# Patient Record
Sex: FEMALE | Race: WHITE | Employment: UNEMPLOYED | URBAN - METROPOLITAN AREA
[De-identification: names, ages, dates, MRNs, and addresses within clinical notes are randomized per-mention and may not be internally consistent; named-entity substitution may affect disease eponyms.]

---

## 2017-01-28 ENCOUNTER — APPOINTMENT (OUTPATIENT)
Dept: ULTRASOUND IMAGING | Age: 82
DRG: 419 | End: 2017-01-28
Attending: PHYSICIAN ASSISTANT
Payer: MEDICARE

## 2017-01-28 ENCOUNTER — APPOINTMENT (OUTPATIENT)
Dept: CT IMAGING | Age: 82
DRG: 419 | End: 2017-01-28
Attending: PHYSICIAN ASSISTANT
Payer: MEDICARE

## 2017-01-28 ENCOUNTER — APPOINTMENT (OUTPATIENT)
Dept: GENERAL RADIOLOGY | Age: 82
DRG: 419 | End: 2017-01-28
Attending: PHYSICIAN ASSISTANT
Payer: MEDICARE

## 2017-01-28 ENCOUNTER — HOSPITAL ENCOUNTER (INPATIENT)
Age: 82
LOS: 3 days | Discharge: HOME | DRG: 419 | End: 2017-01-31
Attending: FAMILY MEDICINE | Admitting: SURGERY
Payer: MEDICARE

## 2017-01-28 DIAGNOSIS — R74.01 TRANSAMINITIS: ICD-10-CM

## 2017-01-28 DIAGNOSIS — K80.00 ACUTE CALCULOUS CHOLECYSTITIS: ICD-10-CM

## 2017-01-28 DIAGNOSIS — K80.42 CHOLEDOCHOLITHIASIS WITH ACUTE CHOLECYSTITIS: Primary | ICD-10-CM

## 2017-01-28 LAB
ALBUMIN SERPL BCP-MCNC: 3.6 G/DL (ref 3.4–5)
ALBUMIN/GLOB SERPL: 1.1 {RATIO} (ref 0.8–1.7)
ALP SERPL-CCNC: 212 U/L (ref 45–117)
ALT SERPL-CCNC: 589 U/L (ref 13–56)
ANION GAP BLD CALC-SCNC: 10 MMOL/L (ref 3–18)
APTT PPP: 26.2 SEC (ref 23–36.4)
AST SERPL W P-5'-P-CCNC: 1206 U/L (ref 15–37)
BASOPHILS # BLD AUTO: 0 K/UL (ref 0–0.06)
BASOPHILS # BLD: 0 % (ref 0–2)
BILIRUB SERPL-MCNC: 2.7 MG/DL (ref 0.2–1)
BNP SERPL-MCNC: 510 PG/ML (ref 0–1800)
BUN SERPL-MCNC: 16 MG/DL (ref 7–18)
BUN/CREAT SERPL: 22 (ref 12–20)
CALCIUM SERPL-MCNC: 8.5 MG/DL (ref 8.5–10.1)
CHLORIDE SERPL-SCNC: 102 MMOL/L (ref 100–108)
CK MB CFR SERPL CALC: 2.6 % (ref 0–4)
CK MB SERPL-MCNC: 1.9 NG/ML (ref 0.5–3.6)
CK SERPL-CCNC: 74 U/L (ref 26–192)
CO2 SERPL-SCNC: 26 MMOL/L (ref 21–32)
CREAT SERPL-MCNC: 0.72 MG/DL (ref 0.6–1.3)
DIFFERENTIAL METHOD BLD: ABNORMAL
EOSINOPHIL # BLD: 0 K/UL (ref 0–0.4)
EOSINOPHIL NFR BLD: 0 % (ref 0–5)
ERYTHROCYTE [DISTWIDTH] IN BLOOD BY AUTOMATED COUNT: 14.3 % (ref 11.6–14.5)
FLUAV AG NPH QL IA: NEGATIVE
FLUBV AG NOSE QL IA: NEGATIVE
GLOBULIN SER CALC-MCNC: 3.3 G/DL (ref 2–4)
GLUCOSE SERPL-MCNC: 127 MG/DL (ref 74–99)
HCT VFR BLD AUTO: 38.5 % (ref 35–45)
HGB BLD-MCNC: 12.6 G/DL (ref 12–16)
INR PPP: 1 (ref 0.8–1.2)
LIPASE SERPL-CCNC: 457 U/L (ref 73–393)
LYMPHOCYTES # BLD AUTO: 7 % (ref 21–52)
LYMPHOCYTES # BLD: 0.8 K/UL (ref 0.9–3.6)
MCH RBC QN AUTO: 28.1 PG (ref 24–34)
MCHC RBC AUTO-ENTMCNC: 32.7 G/DL (ref 31–37)
MCV RBC AUTO: 85.9 FL (ref 74–97)
MONOCYTES # BLD: 0.6 K/UL (ref 0.05–1.2)
MONOCYTES NFR BLD AUTO: 5 % (ref 3–10)
NEUTS SEG # BLD: 10 K/UL (ref 1.8–8)
NEUTS SEG NFR BLD AUTO: 88 % (ref 40–73)
PLATELET # BLD AUTO: 267 K/UL (ref 135–420)
PMV BLD AUTO: 10.2 FL (ref 9.2–11.8)
POTASSIUM SERPL-SCNC: 4 MMOL/L (ref 3.5–5.5)
PROT SERPL-MCNC: 6.9 G/DL (ref 6.4–8.2)
PROTHROMBIN TIME: 12.5 SEC (ref 11.5–15.2)
RBC # BLD AUTO: 4.48 M/UL (ref 4.2–5.3)
SODIUM SERPL-SCNC: 138 MMOL/L (ref 136–145)
TROPONIN I SERPL-MCNC: <0.02 NG/ML (ref 0–0.06)
WBC # BLD AUTO: 11.4 K/UL (ref 4.6–13.2)

## 2017-01-28 PROCEDURE — 96374 THER/PROPH/DIAG INJ IV PUSH: CPT

## 2017-01-28 PROCEDURE — 74177 CT ABD & PELVIS W/CONTRAST: CPT

## 2017-01-28 PROCEDURE — 80053 COMPREHEN METABOLIC PANEL: CPT | Performed by: PHYSICIAN ASSISTANT

## 2017-01-28 PROCEDURE — 85730 THROMBOPLASTIN TIME PARTIAL: CPT | Performed by: PHYSICIAN ASSISTANT

## 2017-01-28 PROCEDURE — 71010 XR CHEST PORT: CPT

## 2017-01-28 PROCEDURE — 82550 ASSAY OF CK (CPK): CPT | Performed by: PHYSICIAN ASSISTANT

## 2017-01-28 PROCEDURE — 96375 TX/PRO/DX INJ NEW DRUG ADDON: CPT

## 2017-01-28 PROCEDURE — 74011250636 HC RX REV CODE- 250/636: Performed by: PHYSICIAN ASSISTANT

## 2017-01-28 PROCEDURE — 85025 COMPLETE CBC W/AUTO DIFF WBC: CPT | Performed by: PHYSICIAN ASSISTANT

## 2017-01-28 PROCEDURE — 96361 HYDRATE IV INFUSION ADD-ON: CPT

## 2017-01-28 PROCEDURE — 74011250636 HC RX REV CODE- 250/636: Performed by: SURGERY

## 2017-01-28 PROCEDURE — 99285 EMERGENCY DEPT VISIT HI MDM: CPT

## 2017-01-28 PROCEDURE — 74011636320 HC RX REV CODE- 636/320: Performed by: FAMILY MEDICINE

## 2017-01-28 PROCEDURE — 96376 TX/PRO/DX INJ SAME DRUG ADON: CPT

## 2017-01-28 PROCEDURE — 83880 ASSAY OF NATRIURETIC PEPTIDE: CPT | Performed by: PHYSICIAN ASSISTANT

## 2017-01-28 PROCEDURE — 74011250636 HC RX REV CODE- 250/636: Performed by: INTERNAL MEDICINE

## 2017-01-28 PROCEDURE — 74011250637 HC RX REV CODE- 250/637: Performed by: SURGERY

## 2017-01-28 PROCEDURE — 76705 ECHO EXAM OF ABDOMEN: CPT

## 2017-01-28 PROCEDURE — 87804 INFLUENZA ASSAY W/OPTIC: CPT | Performed by: PHYSICIAN ASSISTANT

## 2017-01-28 PROCEDURE — 83690 ASSAY OF LIPASE: CPT | Performed by: PHYSICIAN ASSISTANT

## 2017-01-28 PROCEDURE — 85610 PROTHROMBIN TIME: CPT | Performed by: PHYSICIAN ASSISTANT

## 2017-01-28 PROCEDURE — 74011000258 HC RX REV CODE- 258: Performed by: PHYSICIAN ASSISTANT

## 2017-01-28 PROCEDURE — 93005 ELECTROCARDIOGRAM TRACING: CPT

## 2017-01-28 PROCEDURE — C9113 INJ PANTOPRAZOLE SODIUM, VIA: HCPCS | Performed by: PHYSICIAN ASSISTANT

## 2017-01-28 PROCEDURE — 65270000029 HC RM PRIVATE

## 2017-01-28 RX ORDER — ONDANSETRON 2 MG/ML
4 INJECTION INTRAMUSCULAR; INTRAVENOUS
Status: COMPLETED | OUTPATIENT
Start: 2017-01-28 | End: 2017-01-28

## 2017-01-28 RX ORDER — MELATONIN
1000 DAILY
Status: ON HOLD | COMMUNITY
End: 2017-01-28

## 2017-01-28 RX ORDER — EPINEPHRINE 0.1 MG/ML
1 INJECTION INTRACARDIAC; INTRAVENOUS
Status: CANCELLED | OUTPATIENT
Start: 2017-01-28 | End: 2017-01-28

## 2017-01-28 RX ORDER — SODIUM CHLORIDE 9 MG/ML
100 INJECTION, SOLUTION INTRAVENOUS CONTINUOUS
Status: DISCONTINUED | OUTPATIENT
Start: 2017-01-28 | End: 2017-01-30

## 2017-01-28 RX ORDER — CITALOPRAM 20 MG/1
20 TABLET, FILM COATED ORAL DAILY
Status: DISCONTINUED | OUTPATIENT
Start: 2017-01-29 | End: 2017-01-31 | Stop reason: HOSPADM

## 2017-01-28 RX ORDER — PANTOPRAZOLE SODIUM 40 MG/10ML
40 INJECTION, POWDER, LYOPHILIZED, FOR SOLUTION INTRAVENOUS
Status: COMPLETED | OUTPATIENT
Start: 2017-01-28 | End: 2017-01-28

## 2017-01-28 RX ORDER — FLUMAZENIL 0.1 MG/ML
0.2 INJECTION INTRAVENOUS
Status: CANCELLED | OUTPATIENT
Start: 2017-01-28 | End: 2017-01-28

## 2017-01-28 RX ORDER — PANTOPRAZOLE SODIUM 40 MG/1
40 TABLET, DELAYED RELEASE ORAL DAILY
Status: DISCONTINUED | OUTPATIENT
Start: 2017-01-29 | End: 2017-01-31 | Stop reason: HOSPADM

## 2017-01-28 RX ORDER — OXYCODONE AND ACETAMINOPHEN 5; 325 MG/1; MG/1
1 TABLET ORAL
Status: DISCONTINUED | OUTPATIENT
Start: 2017-01-28 | End: 2017-01-31 | Stop reason: HOSPADM

## 2017-01-28 RX ORDER — CITALOPRAM 20 MG/1
20 TABLET, FILM COATED ORAL DAILY
COMMUNITY

## 2017-01-28 RX ORDER — SODIUM CHLORIDE 0.9 % (FLUSH) 0.9 %
5-10 SYRINGE (ML) INJECTION EVERY 8 HOURS
Status: DISCONTINUED | OUTPATIENT
Start: 2017-01-28 | End: 2017-01-31 | Stop reason: HOSPADM

## 2017-01-28 RX ORDER — MIDAZOLAM HYDROCHLORIDE 1 MG/ML
.5-5 INJECTION, SOLUTION INTRAMUSCULAR; INTRAVENOUS
Status: CANCELLED | OUTPATIENT
Start: 2017-01-28 | End: 2017-01-28

## 2017-01-28 RX ORDER — AMLODIPINE BESYLATE 2.5 MG/1
2.5 TABLET ORAL DAILY
Status: DISCONTINUED | OUTPATIENT
Start: 2017-01-29 | End: 2017-01-31 | Stop reason: HOSPADM

## 2017-01-28 RX ORDER — DEXTROSE, SODIUM CHLORIDE, AND POTASSIUM CHLORIDE 5; .45; .15 G/100ML; G/100ML; G/100ML
100 INJECTION INTRAVENOUS CONTINUOUS
Status: DISCONTINUED | OUTPATIENT
Start: 2017-01-28 | End: 2017-01-30

## 2017-01-28 RX ORDER — DIPHENHYDRAMINE HYDROCHLORIDE 50 MG/ML
12.5 INJECTION, SOLUTION INTRAMUSCULAR; INTRAVENOUS
Status: DISCONTINUED | OUTPATIENT
Start: 2017-01-28 | End: 2017-01-31 | Stop reason: HOSPADM

## 2017-01-28 RX ORDER — NALOXONE HYDROCHLORIDE 0.4 MG/ML
0.4 INJECTION, SOLUTION INTRAMUSCULAR; INTRAVENOUS; SUBCUTANEOUS
Status: CANCELLED | OUTPATIENT
Start: 2017-01-28 | End: 2017-01-28

## 2017-01-28 RX ORDER — DONEPEZIL HYDROCHLORIDE 5 MG/1
5 TABLET, FILM COATED ORAL
Status: DISCONTINUED | OUTPATIENT
Start: 2017-01-28 | End: 2017-01-31 | Stop reason: HOSPADM

## 2017-01-28 RX ORDER — ALENDRONATE SODIUM 70 MG/1
70 TABLET ORAL
Status: ON HOLD | COMMUNITY
End: 2017-01-28

## 2017-01-28 RX ORDER — ATENOLOL 25 MG/1
25 TABLET ORAL DAILY
COMMUNITY

## 2017-01-28 RX ORDER — ASPIRIN 81 MG/1
81 TABLET ORAL DAILY
Status: ON HOLD | COMMUNITY
End: 2017-01-28

## 2017-01-28 RX ORDER — AMLODIPINE BESYLATE 2.5 MG/1
5 TABLET ORAL DAILY
COMMUNITY

## 2017-01-28 RX ORDER — ONDANSETRON 2 MG/ML
4 INJECTION INTRAMUSCULAR; INTRAVENOUS
Status: DISCONTINUED | OUTPATIENT
Start: 2017-01-28 | End: 2017-01-31 | Stop reason: HOSPADM

## 2017-01-28 RX ORDER — SODIUM CHLORIDE 9 MG/ML
1000 INJECTION, SOLUTION INTRAVENOUS ONCE
Status: COMPLETED | OUTPATIENT
Start: 2017-01-28 | End: 2017-01-28

## 2017-01-28 RX ORDER — ACETAMINOPHEN 325 MG/1
650 TABLET ORAL
Status: DISCONTINUED | OUTPATIENT
Start: 2017-01-28 | End: 2017-01-31 | Stop reason: HOSPADM

## 2017-01-28 RX ORDER — SODIUM CHLORIDE 9 MG/ML
100 INJECTION, SOLUTION INTRAVENOUS ONCE
Status: COMPLETED | OUTPATIENT
Start: 2017-01-28 | End: 2017-01-28

## 2017-01-28 RX ORDER — DEXTROMETHORPHAN/PSEUDOEPHED 2.5-7.5/.8
1.2 DROPS ORAL
Status: CANCELLED | OUTPATIENT
Start: 2017-01-28

## 2017-01-28 RX ORDER — ACETAMINOPHEN 10 MG/ML
1000 INJECTION, SOLUTION INTRAVENOUS ONCE
Status: COMPLETED | OUTPATIENT
Start: 2017-01-28 | End: 2017-01-28

## 2017-01-28 RX ORDER — SODIUM CHLORIDE 0.9 % (FLUSH) 0.9 %
5-10 SYRINGE (ML) INJECTION AS NEEDED
Status: DISCONTINUED | OUTPATIENT
Start: 2017-01-28 | End: 2017-01-31 | Stop reason: HOSPADM

## 2017-01-28 RX ORDER — ATROPINE SULFATE 0.1 MG/ML
0.5 INJECTION INTRAVENOUS
Status: CANCELLED | OUTPATIENT
Start: 2017-01-28 | End: 2017-01-28

## 2017-01-28 RX ORDER — MORPHINE SULFATE 4 MG/ML
4 INJECTION, SOLUTION INTRAMUSCULAR; INTRAVENOUS
Status: COMPLETED | OUTPATIENT
Start: 2017-01-28 | End: 2017-01-28

## 2017-01-28 RX ORDER — FENTANYL CITRATE 50 UG/ML
100 INJECTION, SOLUTION INTRAMUSCULAR; INTRAVENOUS
Status: CANCELLED | OUTPATIENT
Start: 2017-01-28 | End: 2017-01-28

## 2017-01-28 RX ORDER — HYDROMORPHONE HYDROCHLORIDE 1 MG/ML
1 INJECTION, SOLUTION INTRAMUSCULAR; INTRAVENOUS; SUBCUTANEOUS
Status: DISCONTINUED | OUTPATIENT
Start: 2017-01-28 | End: 2017-01-30

## 2017-01-28 RX ORDER — ATENOLOL 25 MG/1
25 TABLET ORAL DAILY
Status: DISCONTINUED | OUTPATIENT
Start: 2017-01-29 | End: 2017-01-31 | Stop reason: HOSPADM

## 2017-01-28 RX ORDER — DONEPEZIL HYDROCHLORIDE 5 MG/1
5 TABLET, FILM COATED ORAL
COMMUNITY
End: 2021-11-26

## 2017-01-28 RX ORDER — OMEPRAZOLE 40 MG/1
40 CAPSULE, DELAYED RELEASE ORAL DAILY
COMMUNITY
End: 2021-11-26

## 2017-01-28 RX ORDER — GABAPENTIN 100 MG/1
100 CAPSULE ORAL 3 TIMES DAILY
COMMUNITY

## 2017-01-28 RX ORDER — ACETAMINOPHEN 325 MG/1
650 TABLET ORAL
Status: DISCONTINUED | OUTPATIENT
Start: 2017-01-28 | End: 2017-01-30

## 2017-01-28 RX ORDER — GABAPENTIN 100 MG/1
100 CAPSULE ORAL 3 TIMES DAILY
Status: DISCONTINUED | OUTPATIENT
Start: 2017-01-28 | End: 2017-01-31 | Stop reason: HOSPADM

## 2017-01-28 RX ADMIN — ONDANSETRON 4 MG: 2 INJECTION INTRAMUSCULAR; INTRAVENOUS at 14:59

## 2017-01-28 RX ADMIN — Medication 4 MG: at 14:59

## 2017-01-28 RX ADMIN — DONEPEZIL HYDROCHLORIDE 5 MG: 5 TABLET, FILM COATED ORAL at 21:48

## 2017-01-28 RX ADMIN — SODIUM CHLORIDE 1000 ML: 900 INJECTION, SOLUTION INTRAVENOUS at 11:53

## 2017-01-28 RX ADMIN — SODIUM CHLORIDE 100 ML/HR: 900 INJECTION, SOLUTION INTRAVENOUS at 16:28

## 2017-01-28 RX ADMIN — PANTOPRAZOLE SODIUM 40 MG: 40 INJECTION, POWDER, FOR SOLUTION INTRAVENOUS at 11:54

## 2017-01-28 RX ADMIN — ONDANSETRON 4 MG: 2 INJECTION INTRAMUSCULAR; INTRAVENOUS at 11:54

## 2017-01-28 RX ADMIN — CEFOXITIN 2 G: 2 INJECTION, POWDER, FOR SOLUTION INTRAVENOUS at 16:28

## 2017-01-28 RX ADMIN — IOPAMIDOL 100 ML: 612 INJECTION, SOLUTION INTRAVENOUS at 12:43

## 2017-01-28 RX ADMIN — ACETAMINOPHEN 1000 MG: 10 INJECTION, SOLUTION INTRAVENOUS at 11:53

## 2017-01-28 RX ADMIN — DEXTROSE MONOHYDRATE, SODIUM CHLORIDE, AND POTASSIUM CHLORIDE 100 ML/HR: 50; 4.5; 1.49 INJECTION, SOLUTION INTRAVENOUS at 19:36

## 2017-01-28 RX ADMIN — SODIUM CHLORIDE 100 ML/HR: 900 INJECTION, SOLUTION INTRAVENOUS at 14:25

## 2017-01-28 RX ADMIN — GABAPENTIN 100 MG: 100 CAPSULE ORAL at 21:48

## 2017-01-28 NOTE — IP AVS SNAPSHOT
Current Discharge Medication List  
  
Take these medications at their scheduled times Dose & Instructions Dispensing Information Comments Morning Noon Evening Bedtime  
 amLODIPine 2.5 mg tablet Commonly known as:  Rudene Post Your next dose is:  Tomorrow Dose:  2.5 mg Take 2.5 mg by mouth daily. Refills:  0  
     
   
   
   
  
 atenolol 25 mg tablet Commonly known as:  TENORMIN Your next dose is:  Tomorrow Dose:  25 mg Take 25 mg by mouth daily. Refills:  0  
     
   
   
   
  
 citalopram 20 mg tablet Commonly known as:  [place] Breslow Your next dose is:  Tomorrow Dose:  20 mg Take 20 mg by mouth daily. Refills:  0  
     
   
   
   
  
 donepezil 5 mg tablet Commonly known as:  ARICEPT Your next dose is: Today Dose:  5 mg Take 5 mg by mouth nightly. Refills:  0  
     
   
   
   
  
 gabapentin 100 mg capsule Commonly known as:  NEURONTIN Your next dose is: Today Dose:  100 mg Take 100 mg by mouth three (3) times daily. Refills:  0  
     
   
   
   
  
 omeprazole 40 mg capsule Commonly known as:  PRILOSEC Your next dose is:  Tomorrow Dose:  40 mg Take 40 mg by mouth daily. Refills:  0 Take these medications as needed Dose & Instructions Dispensing Information Comments Morning Noon Evening Bedtime  
 oxyCODONE-acetaminophen 5-325 mg per tablet Commonly known as:  PERCOCET Your next dose is: Today Dose:  1 Tab Take 1 Tab by mouth every four (4) hours as needed for Pain. Max Daily Amount: 6 Tabs. Quantity:  20 Tab Refills:  0 ASK your doctor about these medications Dose & Instructions Dispensing Information Comments Morning Noon Evening Bedtime  
 alendronate 70 mg tablet Commonly known as:  FOSAMAX Dose:  70 mg Take 70 mg by mouth every seven (7) days. Refills:  0 aspirin delayed-release 81 mg tablet Your next dose is:  Tomorrow Dose:  81 mg Take 81 mg by mouth daily. Refills:  0  
     
   
   
   
  
 VITAMIN D3 1,000 unit tablet Generic drug:  cholecalciferol Your next dose is: Today Dose:  1000 Units Take 1,000 Units by mouth daily. Refills:  0 Where to Get Your Medications Information about where to get these medications is not yet available ! Ask your nurse or doctor about these medications  
  oxyCODONE-acetaminophen 5-325 mg per tablet

## 2017-01-28 NOTE — IP AVS SNAPSHOT
Lashaun Duarte 
 
 
 [address] [facility] [address] 
[phone] Patient: Shannan Brewer MRN: [id number] You are allergic to the following No active allergies Recent Documentation Height Weight BMI Smoking Status 1.575 m 64.9 kg 26.15 kg/m2 Never Smoker Emergency Contacts Name Discharge Info Relation Home Work Mobile [facility] DISCHARGE CAREGIVER [3] Son [22]   [phone] About your hospitalization You were admitted on:  January 28, 2017 You last received care in the:  [address] [place] [address] You were discharged on:  January 31, 2017 Unit phone number:  [phone] Why you were hospitalized Your primary diagnosis was:  Not on File Your diagnoses also included:  Choledocholithiasis With Acute Cholecystitis, Cholecystitis, Acute With Cholelithiasis Providers Seen During Your Hospitalizations Provider Role Specialty Primary office phone Eliud Maldonado MD Attending Provider Emergency Medicine [phone] Baron Mckeon MD Attending Provider General Surgery [phone] Your Primary Care Physician (PCP) Primary Care Physician Office Phone Office Fax OTHER, PHYS ** None ** ** None ** Follow-up Information Follow up With Details Comments Contact Info Baron Mckeon MD On 2/7/2017 Follow-up appointment @ 1:30 p.m. [address] [facility] [address] 
[phone] Ashleigh Doe MD   Patient can only remember the practice name and not the physician Current Discharge Medication List  
  
START taking these medications Dose & Instructions Dispensing Information Comments Morning Noon Evening Bedtime  
 oxyCODONE-acetaminophen 5-325 mg per tablet Commonly known as:  PERCOCET Your next dose is: Today Dose:  1 Tab Take 1 Tab by mouth every four (4) hours as needed for Pain. Max Daily Amount: 6 Tabs. Quantity:  20 Tab Refills:  0 CONTINUE these medications which have NOT CHANGED Dose & Instructions Dispensing Information Comments Morning Noon Evening Bedtime  
 amLODIPine 2.5 mg tablet Commonly known as:  Nette Croon Your next dose is:  Tomorrow Dose:  2.5 mg Take 2.5 mg by mouth daily. Refills:  0  
     
   
   
   
  
 atenolol 25 mg tablet Commonly known as:  TENORMIN Your next dose is:  Tomorrow Dose:  25 mg Take 25 mg by mouth daily. Refills:  0  
     
   
   
   
  
 citalopram 20 mg tablet Commonly known as:  [place] Campanile Your next dose is:  Tomorrow Dose:  20 mg Take 20 mg by mouth daily. Refills:  0  
     
   
   
   
  
 donepezil 5 mg tablet Commonly known as:  ARICEPT Your next dose is: Today Dose:  5 mg Take 5 mg by mouth nightly. Refills:  0  
     
   
   
   
  
 gabapentin 100 mg capsule Commonly known as:  NEURONTIN Your next dose is: Today Dose:  100 mg Take 100 mg by mouth three (3) times daily. Refills:  0  
     
   
   
   
  
 omeprazole 40 mg capsule Commonly known as:  PRILOSEC Your next dose is:  Tomorrow Dose:  40 mg Take 40 mg by mouth daily. Refills:  0 ASK your doctor about these medications Dose & Instructions Dispensing Information Comments Morning Noon Evening Bedtime  
 alendronate 70 mg tablet Commonly known as:  FOSAMAX Dose:  70 mg Take 70 mg by mouth every seven (7) days. Refills:  0  
     
   
   
   
  
 aspirin delayed-release 81 mg tablet Your next dose is:  Tomorrow Dose:  81 mg Take 81 mg by mouth daily. Refills:  0  
     
   
   
   
  
 VITAMIN D3 1,000 unit tablet Generic drug:  cholecalciferol Your next dose is: Today Dose:  1000 Units Take 1,000 Units by mouth daily. Refills:  0 Where to Get Your Medications Information on where to get these meds will be given to you by the nurse or doctor. ! Ask your nurse or doctor about these medications  
  oxyCODONE-acetaminophen 5-325 mg per tablet Discharge Instructions DISCHARGE SUMMARY from Nurse The following personal items are in your possession at time of discharge: 
 
Dental Appliances: None Home Medications: None Jewelry: None Clothing: None Other Valuables: None PATIENT INSTRUCTIONS: 
 
 
F-face looks uneven A-arms unable to move or move unevenly S-speech slurred or non-existent T-time-call 911 as soon as signs and symptoms begin-DO NOT go Back to bed or wait to see if you get better-TIME IS BRAIN. Warning Signs of HEART ATTACK Call 911 if you have these symptoms: 
? Chest discomfort. Most heart attacks involve discomfort in the center of the chest that lasts more than a few minutes, or that goes away and comes back. It can feel like uncomfortable pressure, squeezing, fullness, or pain. ? Discomfort in other areas of the upper body. Symptoms can include pain or discomfort in one or both arms, the back, neck, jaw, or stomach. ? Shortness of breath with or without chest discomfort. ? Other signs may include breaking out in a cold sweat, nausea, or lightheadedness. Don't wait more than five minutes to call [address]! Fast action can save your life. Calling 911 is almost always the fastest way to get lifesaving treatment.  Emergency Medical Services staff can begin treatment when they arrive  up to an hour sooner than if someone gets to the hospital by car. The discharge information has been reviewed with the patient and guardian. The patient and guardian verbalized understanding. Discharge medications reviewed with the patient and guardian and appropriate educational materials and side effects teaching were provided. Cholecystectomy: What to Expect at [facility] Your Recovery After your surgery, it is normal to feel weak and tired for several days after you return home. Your belly may be swollen. If you had laparoscopic surgery, you may also have pain in your shoulder for about 24 hours. You may have gas or need to burp a lot at first, and a few people get diarrhea. The diarrhea usually goes away in 2 to 4 weeks, but it may last longer. How quickly you recover depends on whether you had a laparoscopic or open surgery. · For a laparoscopic surgery, most people can go back to work or their normal routine in 1 to 2 weeks, but it may take longer, depending on the type of work you do. · For an open surgery, it will probably take 4 to 6 weeks before you get back to your normal routine. This care sheet gives you a general idea about how long it will take for you to recover. However, each person recovers at a different pace. Follow the steps below to get better as quickly as possible. How can you care for yourself at home? Activity · Rest when you feel tired. Getting enough sleep will help you recover. · Try to walk each day. Start out by walking a little more than you did the day before. Gradually increase the amount you walk. Walking boosts blood flow and helps prevent pneumonia and constipation. · For about 2 to 4 weeks, avoid lifting anything that would make you strain. This may include a child, heavy grocery bags and milk containers, a heavy briefcase or backpack, cat litter or dog food bags, or a vacuum . · Avoid strenuous activities, such as biking, jogging, weightlifting, and aerobic exercise, until your doctor says it is okay. · You may shower 24 to 48 hours after surgery, if your doctor okays it. Pat the cut (incision) dry. Do not take a bath for the first 2 weeks, or until your doctor tells you it is okay. · You may drive when you are no longer taking pain medicine and can quickly move your foot from the gas pedal to the brake. You must also be able to sit comfortably for a long period of time, even if you do not plan to go far. You might get caught in traffic. · For a laparoscopic surgery, most people can go back to work or their normal routine in 1 to 2 weeks, but it may take longer. For an open surgery, it will probably take 4 to 6 weeks before you get back to your normal routine. · Your doctor will tell you when you can have sex again. Diet · Eat smaller meals more often instead of fewer larger meals. You can eat a normal diet, but avoid eating fatty foods for about 1 month. Fatty foods include hamburger, whole milk, cheese, and many snack foods. If your stomach is upset, try bland, low-fat foods like plain rice, broiled chicken, toast, and yogurt. · Drink plenty of fluids (unless your doctor tells you not to). · If you have diarrhea, try avoiding spicy foods, dairy products, fatty foods, and alcohol. You can also watch to see if specific foods cause it, and stop eating them. If the diarrhea continues for more than 2 weeks, talk to your doctor. · You may notice that your bowel movements are not regular right after your surgery. This is common. Try to avoid constipation and straining with bowel movements. You may want to take a fiber supplement every day. If you have not had a bowel movement after a couple of days, ask your doctor about taking a mild laxative. Medicines · Your doctor will tell you if and when you can restart your medicines.  He or she will also give you instructions about taking any new medicines. · If you take blood thinners, such as warfarin (Coumadin), clopidogrel (Plavix), or aspirin, be sure to talk to your doctor. He or she will tell you if and when to start taking those medicines again. Make sure that you understand exactly what your doctor wants you to do. · Take pain medicines exactly as directed. ¨ If the doctor gave you a prescription medicine for pain, take it as prescribed. ¨ If you are not taking a prescription pain medicine, take an over-the-counter medicine such as acetaminophen (Tylenol), ibuprofen (Advil, Motrin), or naproxen (Aleve). Read and follow all instructions on the label. ¨ Do not take two or more pain medicines at the same time unless the doctor told you to. Many pain medicines contain acetaminophen, which is Tylenol. Too much Tylenol can be harmful. · If you think your pain medicine is making you sick to your stomach: 
¨ Take your medicine after meals (unless your doctor tells you not to). ¨ Ask your doctor for a different pain medicine. · If your doctor prescribed antibiotics, take them as directed. Do not stop taking them just because you feel better. You need to take the full course of antibiotics. Incision care · If you have strips of tape on the incision, or cut, leave the tape on for a week or until it falls off. · After 24 to 48 hours, wash the area daily with warm, soapy water, and pat it dry. · You may have staples to hold the cut together. Keep them dry until your doctor takes them out. This is usually in 7 to 10 days. · Keep the area clean and dry. You may cover it with a gauze bandage if it weeps or rubs against clothing. Change the bandage every day. Ice · To reduce swelling and pain, put ice or a cold pack on your belly for 10 to 20 minutes at a time. Do this every 1 to 2 hours. Put a thin cloth between the ice and your skin. Follow-up care is a key part of your treatment and safety. Be sure to make and go to all appointments, and call your doctor if you are having problems. It's also a good idea to know your test results and keep a list of the medicines you take. When should you call for help? Call 911 anytime you think you may need emergency care. For example, call if: 
· You passed out (lost consciousness). · You have severe trouble breathing. · You have sudden chest pain and shortness of breath, or you cough up blood. Call your doctor now or seek immediate medical care if: 
· You are sick to your stomach and cannot drink fluids. · You have pain that does not get better when you take your pain medicine. · You have signs of infection, such as: 
¨ Increased pain, swelling, warmth, or redness. ¨ Red streaks leading from the incision. ¨ Pus draining from the incision. ¨ Swollen lymph nodes in your neck, armpits, or groin. ¨ A fever. · Your urine turns dark brown or your stool is light-colored or janna-colored. · Your skin or the whites of your eyes turn yellow. · Bright red blood has soaked through a large bandage over your incision. · You have signs of a blood clot, such as: 
¨ Pain in your calf, back of knee, thigh, or groin. ¨ Redness and swelling in your leg or groin. · You have trouble passing urine or stool, especially if you have mild pain or swelling in your lower belly. Watch closely for any changes in your health, and be sure to contact your doctor if: 
· You had a laparoscopic surgery and your shoulder pain lasts more than 24 hours. · You do not have a bowel movement after taking a laxative. Where can you learn more? Go to http://kendall-asher.info/. Enter [id number] in the search box to learn more about \"Cholecystectomy: What to Expect at Home. \" Current as of: August 9, 2016 Content Version: 11.1 © 7067-4239 [employer].  Care instructions adapted under license by [address] (which disclaims liability or warranty for this information). If you have questions about a medical condition or this instruction, always ask your healthcare professional. [address] any warranty or liability for your use of this information. Discharge Orders None Introducing [facility]! Romayne Dustmerritt introduces Teamly patient portal. Now you can access parts of your medical record, email your doctor's office, and request medication refills online. 1. In your internet browser, go to [email or web address] 2. Click on the First Time User? Click Here link in the Sign In box. You will see the New Member Sign Up page. 3. Enter your Teamly Access Code exactly as it appears below. You will not need to use this code after youve completed the sign-up process. If you do not sign up before the expiration date, you must request a new code. · Teamly Access Code: [email or web address] Expires: 4/28/2017 11:07 AM 
 
4. Enter the last four digits of your Social Security Number (xxxx) and Date of Birth (mm/dd/yyyy) as indicated and click Submit. You will be taken to the next sign-up page. 5. Create a Teamly ID. This will be your Teamly login ID and cannot be changed, so think of one that is secure and easy to remember. 6. Create a Teamly password. You can change your password at any time. 7. Enter your Password Reset Question and Answer. This can be used at a later time if you forget your password. 8. Enter your e-mail address. You will receive e-mail notification when new information is available in [address] 9. Click Sign Up. You can now view and download portions of your medical record. 10. Click the Download Summary menu link to download a portable copy of your medical information.  
 
If you have questions, please visit the Frequently Asked Questions section of the [employer]. Remember, MyChart is NOT to be used for urgent needs. For medical emergencies, dial 911. Now available from your iPhone and Android! General Information Please provide this summary of care documentation to your next provider. Patient Signature:  ____________________________________________________________ Date:  ____________________________________________________________  
  
Scharlene Alosa Provider Signature:  ____________________________________________________________ Date:  ____________________________________________________________

## 2017-01-28 NOTE — ED PROVIDER NOTES
HPI Comments: 11:30 AM  Saige Abdi is a 80 y.o. female with hx of HTN, GERD, and BCA with mastectomy in remission presenting to the ED via EMS with abdominal pain that started a couple of days ago accompanied by N/V. Pain is diffuse but worst in RUQ. Since then, her sxs now include a constant substernal CP that is painful to the touch, HA, sinus congestion, in addition to a constant dry cough that started last PM. This cough is unusual from her normal cough. Pt arrived to the area from [place] 1 week ago where she visited for 3 weeks, and usually lives out of state in [place]. She received her flu shot this year. PMHx of GERD, HTN, depression, Osteoperosis, Vitamin D deficiency, and Breast CA. Surgical hx of Mastectomy and spinal fusion. Unsure of abdominal surgeries. Takes daily ASA and Gabapentin, and endorses EtOH use at 2 drinks/day. Pt denies SOB, diarrhea, fever, chills, known sick contacts, hx of heart conditions including MI and blood clots, other blood thinner use, and any other sxs or complaints at this time. Written by YUSRA Pickett, as dictated by Espinoza Campo PA-C       The history is provided by the patient and a relative. No  was used. Past Medical History:   Diagnosis Date    Hypertension        History reviewed. No pertinent past surgical history. History reviewed. No pertinent family history. Social History     Social History    Marital status:      Spouse name: N/A    Number of children: N/A    Years of education: N/A     Occupational History    Not on file. Social History Main Topics    Smoking status: Never Smoker    Smokeless tobacco: Not on file    Alcohol use Yes    Drug use: Not on file    Sexual activity: Not on file     Other Topics Concern    Not on file     Social History Narrative    No narrative on file         ALLERGIES: Review of patient's allergies indicates no known allergies.     Review of Systems Constitutional: Negative for chills and fever. HENT: Positive for congestion. Negative for sore throat. Respiratory: Positive for cough. Negative for shortness of breath. Cardiovascular: Positive for chest pain. Gastrointestinal: Positive for abdominal pain, nausea and vomiting. Genitourinary: Negative for difficulty urinating, dysuria, frequency and urgency. Musculoskeletal: Negative for back pain. Skin: Negative for rash and wound. Allergic/Immunologic: Negative for immunocompromised state. Neurological: Positive for headaches. Negative for dizziness, weakness and light-headedness. Hematological: Does not bruise/bleed easily. Vitals:    01/28/17 1118 01/28/17 1119 01/28/17 1130   BP: 176/60 168/57 168/62   Pulse: 72 69 75   Resp: 18 17 19   Temp: 97.8 °F (36.6 °C)     SpO2:  93% 94%   Weight: 64.9 kg (143 lb)     Height: 5' 2\" (1.575 m)              Physical Exam   Constitutional: She is oriented to person, place, and time. She appears well-developed and well-nourished. No distress.  female in NAD. Appears uncomfortable. No resp distress or acc muscle use. HENT:   Head: Normocephalic and atraumatic. Right Ear: External ear normal. No swelling or tenderness. Tympanic membrane is not perforated, not erythematous and not bulging. Left Ear: External ear normal. No swelling or tenderness. Tympanic membrane is not perforated, not erythematous and not bulging. Nose: Nose normal. No mucosal edema or rhinorrhea. Right sinus exhibits no maxillary sinus tenderness and no frontal sinus tenderness. Left sinus exhibits no maxillary sinus tenderness and no frontal sinus tenderness. Mouth/Throat: Uvula is midline, oropharynx is clear and moist and mucous membranes are normal. No oral lesions. No trismus in the jaw. No dental abscesses or uvula swelling. No oropharyngeal exudate, posterior oropharyngeal edema, posterior oropharyngeal erythema or tonsillar abscesses.    Eyes: Conjunctivae are normal.   Neck: Normal range of motion. Cardiovascular: Normal rate, regular rhythm, normal heart sounds and intact distal pulses. Exam reveals no gallop and no friction rub. No murmur heard. Pulmonary/Chest: Effort normal and breath sounds normal. No accessory muscle usage. No tachypnea. No respiratory distress. She has no decreased breath sounds. She has no wheezes. She has no rhonchi. She has no rales. She exhibits tenderness. Abdominal: Soft. Normal appearance. She exhibits no distension, no ascites and no mass. There is tenderness in the right upper quadrant and epigastric area. There is positive Russell's sign. There is no rigidity, no rebound, no guarding, no CVA tenderness and no tenderness at McBurney's point. Musculoskeletal: Normal range of motion. Neurological: She is alert and oriented to person, place, and time. Skin: Skin is warm and dry. No rash noted. She is not diaphoretic. No erythema. Psychiatric: She has a normal mood and affect. Judgment normal.   Nursing note and vitals reviewed. RESULTS:    EKG interpretation: (Preliminary)  11:12 AM   Sinus rhythm at 68 bpm with 1st degree AV block, Left axis deviation  EKG read by Court Edgar PA-C     US ABD LTD   Final Result   IMPRESSION:       Acute cholecystitis with mild intrahepatic and more prominent common bile duct   dilatation, suggestive of choledocholithiasis as well. As read by the radiologist.   CT ABD PELV W CONT   Final Result   IMPRESSION:  1. CT findings compatible with acute calculus cholecystitis, characterized by a  distended gallbladder, pericholecystic fluid, and pericholecystic fat stranding. Numerous calcified gallstones are present. Mild associated intrahepatic biliary  ductal dilatation noted. 2. Moderate size hiatal hernia without evidence of bowel obstruction. Diverticulosis without evidence of diverticulitis.   3. Rotatory scoliosis of the thoracolumbar spine, with multilevel lumbar  spondylosis and facet osteoarthrosis. Grade 1 anterolisthesis of L3 on L4 as  well as L4 and L5 is noted. As read by the radiologist.   XR CHEST PORT   Final Result   Impression:  Mildly underexpanded lungs without superimposed acute radiographic abnormality.    As read by the radiologist.          Labs Reviewed   CBC WITH AUTOMATED DIFF - Abnormal; Notable for the following:        Result Value    NEUTROPHILS 88 (*)     LYMPHOCYTES 7 (*)     ABS. NEUTROPHILS 10.0 (*)     ABS. LYMPHOCYTES 0.8 (*)     All other components within normal limits   METABOLIC PANEL, COMPREHENSIVE - Abnormal; Notable for the following:     Glucose 127 (*)     BUN/Creatinine ratio 22 (*)     Bilirubin, total 2.7 (*)      (*)     AST 1206 (*)     Alk. phosphatase 212 (*)     All other components within normal limits   LIPASE - Abnormal; Notable for the following:     Lipase 457 (*)     All other components within normal limits   INFLUENZA A & B AG (RAPID TEST)   CARDIAC PANEL,(CK, CKMB & TROPONIN)   PRO-BNP   PROTHROMBIN TIME + INR   PTT       Recent Results (from the past 12 hour(s))   CBC WITH AUTOMATED DIFF    Collection Time: 01/28/17 11:07 AM   Result Value Ref Range    WBC 11.4 4.6 - 13.2 K/uL    RBC 4.48 4.20 - 5.30 M/uL    HGB 12.6 12.0 - 16.0 g/dL    HCT 38.5 35.0 - 45.0 %    MCV 85.9 74.0 - 97.0 FL    MCH 28.1 24.0 - 34.0 PG    MCHC 32.7 31.0 - 37.0 g/dL    RDW 14.3 11.6 - 14.5 %    PLATELET 231 210 - 704 K/uL    MPV 10.2 9.2 - 11.8 FL    NEUTROPHILS 88 (H) 40 - 73 %    LYMPHOCYTES 7 (L) 21 - 52 %    MONOCYTES 5 3 - 10 %    EOSINOPHILS 0 0 - 5 %    BASOPHILS 0 0 - 2 %    ABS. NEUTROPHILS 10.0 (H) 1.8 - 8.0 K/UL    ABS. LYMPHOCYTES 0.8 (L) 0.9 - 3.6 K/UL    ABS. MONOCYTES 0.6 0.05 - 1.2 K/UL    ABS. EOSINOPHILS 0.0 0.0 - 0.4 K/UL    ABS.  BASOPHILS 0.0 0.0 - 0.06 K/UL    DF AUTOMATED     METABOLIC PANEL, COMPREHENSIVE    Collection Time: 01/28/17 11:07 AM   Result Value Ref Range    Sodium 138 136 - 145 mmol/L Potassium 4.0 3.5 - 5.5 mmol/L    Chloride 102 100 - 108 mmol/L    CO2 26 21 - 32 mmol/L    Anion gap 10 3.0 - 18 mmol/L    Glucose 127 (H) 74 - 99 mg/dL    BUN 16 7.0 - 18 MG/DL    Creatinine 0.72 0.6 - 1.3 MG/DL    BUN/Creatinine ratio 22 (H) 12 - 20      GFR est AA >60 >60 ml/min/1.73m2    GFR est non-AA >60 >60 ml/min/1.73m2    Calcium 8.5 8.5 - 10.1 MG/DL    Bilirubin, total 2.7 (H) 0.2 - 1.0 MG/DL     (H) 13 - 56 U/L    AST 1206 (H) 15 - 37 U/L    Alk.  phosphatase 212 (H) 45 - 117 U/L    Protein, total 6.9 6.4 - 8.2 g/dL    Albumin 3.6 3.4 - 5.0 g/dL    Globulin 3.3 2.0 - 4.0 g/dL    A-G Ratio 1.1 0.8 - 1.7     LIPASE    Collection Time: 01/28/17 11:07 AM   Result Value Ref Range    Lipase 457 (H) 73 - 393 U/L   CARDIAC PANEL,(CK, CKMB & TROPONIN)    Collection Time: 01/28/17 11:07 AM   Result Value Ref Range    CK 74 26 - 192 U/L    CK - MB 1.9 0.5 - 3.6 ng/ml    CK-MB Index 2.6 0.0 - 4.0 %    Troponin-I, Qt. <0.02 0.00 - 0.06 NG/ML   PRO-BNP    Collection Time: 01/28/17 11:07 AM   Result Value Ref Range    NT pro- 0 - 1800 PG/ML   PROTHROMBIN TIME + INR    Collection Time: 01/28/17 11:07 AM   Result Value Ref Range    Prothrombin time 12.5 11.5 - 15.2 sec    INR 1.0 0.8 - 1.2     PTT    Collection Time: 01/28/17 11:07 AM   Result Value Ref Range    aPTT 26.2 23.0 - 36.4 SEC   EKG, 12 LEAD, INITIAL    Collection Time: 01/28/17 11:12 AM   Result Value Ref Range    Ventricular Rate 68 BPM    Atrial Rate 68 BPM    P-R Interval 214 ms    QRS Duration 94 ms    Q-T Interval 428 ms    QTC Calculation (Bezet) 455 ms    Calculated P Axis 58 degrees    Calculated R Axis -31 degrees    Calculated T Axis 55 degrees    Diagnosis       Sinus rhythm with 1st degree AV block  Left axis deviation  Abnormal ECG  No previous ECGs available     INFLUENZA A & B AG (RAPID TEST)    Collection Time: 01/28/17 12:03 PM   Result Value Ref Range    Influenza A Antigen NEGATIVE  NEG      Influenza B Antigen NEGATIVE  NEG MDM  Number of Diagnoses or Management Options  Acute calculous cholecystitis:   Transaminitis:   Diagnosis management comments: ACS/MI, arrhythmia, PNA, bronchitis, chest wall pain, cholecystitis, cholithiasis, pancreatitis, hepatitis, gastritis, PUD, GERD. Doubt dissection       Amount and/or Complexity of Data Reviewed  Clinical lab tests: ordered and reviewed  Tests in the radiology section of CPT®: ordered and reviewed (US Abd, CXR, CT Abd/Pelv W Cont)  Tests in the medicine section of CPT®: ordered and reviewed (EKG)  Obtain history from someone other than the patient: yes (Son)  Discuss the patient with other providers: yes Thomas Meza MD (Surgery)  CHANTEL Brunson MD (Gastroenterology)  )  Independent visualization of images, tracings, or specimens: yes (CXR, EKG)    Critical Care  Total time providing critical care: 30-74 minutes (30 minutes)    ED Course     Medications   acetaminophen (OFIRMEV) infusion 1,000 mg (1,000 mg IntraVENous Given 1/28/17 1153)   0.9% sodium chloride infusion 1,000 mL (0 mL IntraVENous IV Completed 1/28/17 1428)   pantoprazole (PROTONIX) injection 40 mg (40 mg IntraVENous Given 1/28/17 1154)   ondansetron (ZOFRAN) injection 4 mg (4 mg IntraVENous Given 1/28/17 1154)   iopamidol (ISOVUE 300) 61 % contrast injection  mL (100 mL IntraVENous Given 1/28/17 1243)   0.9% sodium chloride infusion (100 mL/hr IntraVENous New Bag 1/28/17 1425)   morphine injection 4 mg (4 mg IntraVENous Given 1/28/17 1459)   ondansetron (ZOFRAN) injection 4 mg (4 mg IntraVENous Given 1/28/17 1459)        Procedures    PROGRESS NOTE:  11:30 AM  Initial assessment performed. Written by Ronny Fields, ED Scribe, as dictated by Adrián Linares PA-C    PROGRESS NOTE:   2:54 PM  Pt is omplaining of HA and mild abdominal pain. We will contact general surgery. Written by Myrna Marin, ED Scribe, as dictated by Adrián Linares PA-C.     FACE-TO-FACE PROGRESS NOTE:  2:55 PM  Was requested to see pt by the [facility]. Evaluated pt face-to-face. Elderly female presented with epigastric pain with nausea and vomiting. On exam: doesnt appear to feel well, epigastric tenderness, heart has regular rate and rhythm, and lungs are clear. Written by YUSRA Lindsey, as dictated by Izzy Ely MD.    CONSULT NOTE:   3:07 PM  Tyler Velazco PA-C spoke with Corby Martin MD   Specialty: General Surgery  Discussed pt's hx, disposition, and available diagnostic and imaging results over the telephone. Reviewed care plans. Consulting physician agrees with plans as outlined. She will accept the pt for admission, and she requests that I consult GI before admission. Written by YUSRA Whitt, as dictated by Tyler Velazco PA-C.    CONSULT NOTE:   3:10 PM  Tyler Velazco PA-C spoke with Matt Harman MD  Specialty: Gastroenterology  Discussed pt's hx, disposition, and available diagnostic and imaging results. Reviewed care plans. Consulting physician agrees with plans as outlined. He will evaluate the pt, most likely ERCP, but we'll admit to general surgery. Written by YUSRA Whitt, as dictated by Tyler Velazco PA-C    ADMISSION NOTE:  3:10 PM  Patient is being admitted to the hospital by Corby Martin MD. The results of their tests and reasons for their admission have been discussed with them and/or available family. They convey agreement and understanding for the need to be admitted and for their admission diagnosis. Written by YUSRA Whitt, as dictated by Tyler Velazco PA-C. PROGRESS NOTE:   3:17 PM  Pt is resting comfortably and awaiting GI and general surgery consult. Written by YUSRA Whitt, as dictated by Tyler Velazco PA-C.    CONDITIONS ON ADMISSION:  Deep Vein Thrombosis is not present at the time of admission. Thrombosis is not present at the time of admission. Pneumonia is not present at the time of admission.  MRSA is not present at the time of admission. Wound infection is not present at the time of admission. Pressure Ulcer is not present at the time of admission. CLINICAL IMPRESSION:    1. Choledocholithiasis with acute cholecystitis    2. Acute calculous cholecystitis    3. Transaminitis        ATTESTATIONS:  This note is prepared by Aniket La, acting as Scribe for Susu Phelps PA-C. Susu Phelps PA-C: The scribe's documentation has been prepared under my direction and personally reviewed by me in its entirety. I confirm that the note above accurately reflects all work, treatment, procedures, and medical decision making performed by me.     1:26 PM  I have spent 30 minutes of critical care time involved in lab review, consultations with specialist, family decision-making, and documentation. During this entire length of time I was immediately available to the patient. Critical Care: The reason for providing this level of medical care for this critically ill patient was due a critical illness that impaired one or more vital organ systems such that there was a high probability of imminent or life threatening deterioration in the patients condition. This care involved high complexity decision making to assess, manipulate, and support vital system functions, to treat this degreee vital organ system failure and to prevent further life threatening deterioration of the patients condition.

## 2017-01-28 NOTE — CONSULTS
[address] [place] [address] Rd    Name:  Federico Romero  MR#:  [id number]  :  [id number]  Account #:  [de-identified]  Date of Adm:  2017  Date of Consultation:  2017      HISTORY OF PRESENT ILLNESS: The patient is an 44-year-old  female who came to the emergency room today by ambulance around  11 o'clock complaining of severe upper abdominal pain that started  yesterday morning. The patient apparently was able to go to a  restaurant at 8 p.m. and had a good dinner, but at 1 o'clock in the  morning, she was awakened with substernal chest pain, coughing, and  had multiple nausea and vomiting. In the morning, her son, whom she  was visiting from [place], decided to bring her here by  ambulance. At that time, the patient was complaining of severe  headaches. When she arrived to the emergency room, she was  afebrile, temperature was 97.8, pulse 72, blood pressure 176/60,  breathing 18. Saturation 93-94% on room air. The patient apparently  never had any fever, chills, shivers, or dark urine, but she was found to  have very abnormal liver enzymes with a total bilirubin of 2.7, ,  AST 1206, alkaline phosphatase 219, lipase 457. Her CBC was normal  except for a WBC of 11.4, 88% neutrophils, no bands. Ultrasound of  the abdomen showed evidence of mild cholecystitis with distended  gallbladder to 13.2 cm, mild gallbladder wall thickening with  pericholecystic fluid, mild prominence of the intra and extrahepatic  biliary tree with the common bile duct at 13 mm. The pancreatic duct  was at the upper limit of normal, the head and body are unremarkable,  but the tail was obscured by gas. CT scan of the abdomen and pelvis  showed basically the same thing except there was some hepatic  parenchymal enhancement. The mild fatty atrophy of the pancreas is  present without evidence of dark pancreatic ductal dilatation.  There  was scattered subcentimeter mesenteric and retroperitoneal lymph  nodes present. Atherosclerotic calcification of the anterior renal,  abdominal aorta, and bilateral iliac vessels. She has moderately  severe to multilevel to thoracolumbar spondylosis with rotoscoliosis of  the lumbar spine, degenerative changes. Moderate size hiatal hernia,  diverticulosis without diverticulitis. The patient received morphine at the emergency room, which helped  her abdominal pain, but she still has some substernal chest pain that  does not radiate to the back. It does not take her breath away. She is  not short of breath. She stopped coughing. She has not had any  nausea or vomiting since she has been here. She denies having any previous similar attacks in the past. She is  known to have chronic GERD, has been on omeprazole 40 mg daily  for many years. She usually has regular bowel movement every day. She denies having dysphagia. Her weight has been stable. She has no  coronary artery disease, no history of stroke or diabetes. PAST MEDICAL HISTORY: Besides the GERD is remarkable for  osteoporosis and hypertension. She has mild dementia. She has a  history of breast cancer many years ago. She had a previous  hysterectomy. MEDICATIONS AT HOME: Includes  1. Fosamax 70.  2. Norvasc 2.5 mg.  3. Aspirin 81 mg.  4. Tenormin 25 mg.  5. Celexa 20.  6. Aricept 5 mg. 7. Neurontin 100 mg.  8. Prilosec 40 mg.  9. Vitamin D3 1000 tablets. PHYSICAL EXAMINATION  SKIN: Unremarkable. No evidence any rash. EYES: Unremarkable. The pupils are equal and reactive to light. Sclerae are anicteric. The conjunctivae are pink. ENT: The oropharyngeal cavity is remarkable for very dry mouth with  some brownish discoloration on the top of the tongue. She has her  own teeth. NECK: Supple. No palpable mass, no enlarged thyroid. LUNGS: Clear to auscultation and percussion. CARDIAC: Rhythm is regular. S1, S2 normal. No murmur. ABDOMEN: Not distended. There is no tenderness, mass or  organomegaly. Bowel sounds are normal. There is no CVA  tenderness. There is no costochondral or sternal tenderness. EXTREMITIES: Remarkable for moderate to severe finger joint  deformity and thickening compatible with Heberden and Emely  nodules. No clubbing, no tremor. NEUROLOGICAL: She is alert and oriented, slightly hard of hearing,  but moves all her 4 extremities. This patient was visiting her son and was supposed to use to [place]  today. LABORATORY DATA: As stated above. Her CBC is normal.  Hemoglobin 12.6, WBC 11.4, 88% neutrophils. Her kidney function is  normal. Glucose 127, BUN 16, creatinine 0.72. Troponin less than  0.02. CONCLUSION: This is an 80-year-old female who presents with upper  abdominal pain that started yesterday morning and got worse during  last night with nausea, vomiting. After this, the patient started to have  coughing and chest pain. When she arrived to the emergency room,  her liver enzymes has been considerably abnormal with elevated  lipase at 457, suggestive of mild pancreatitis, although there were no  signs of pancreatitis on the CT scan. This patient apparently drinks on a regular basis, 2-3 alcoholic  beverages every day. There is maybe an element of alcoholic-induced  hepatitis, but definitely there is most likely a common bile duct stone on  top of her acute cholecystitis. She will require to have 2 procedures;  cholecystectomy and common bile duct exploration with ERCP. I  explained to the patient and her son the procedure of ERCP, the  alternatives and the risks involved which include, but not limited to  pancreatitis in up to 8% of the cases, bleeding, perforation, not being  able to cannulate, where in this case she may require to have a  second procedure. There is also risk of infection. She will be started on  Mefoxin.     This patient also has hypertension, has chronic gastroesophageal  reflux disease, has medium-sized hiatal hernia, has been on high-dose  omeprazole 40 mg daily. She has a history of previous breast cancer. Her  already  from pancreatic cancer many years ago. I feel more comfortable that we could do the procedure tomorrow  morning and hopefully that her lipase will go down to better lever and  tomorrow will allow us to do both procedures back to back, ERCP and  then cholecystectomy, or vice versa. Would like to repeat her LFTs. I  am tempted to request an MRI, but I am concerned that at her age 80  and she may not be able to hold her breath to get an adequate study. Her saturation already is 93-94% on room air and this may not allow  her to hold her breath to do adequate evaluation of her bile duct. Further note will follow. MD LEYLA Horton / Shyann Jackson  D:  2017   16:10  T:  2017   17:14  Job #:  [id number]

## 2017-01-28 NOTE — ED NOTES
TRANSFER - OUT REPORT:    Verbal report given to Renetta Mortensen RN(name) on Jessy Cuevas  being transferred to Room 328(unit) for routine progression of care       Report consisted of patients Situation, Background, Assessment and   Recommendations(SBAR). Information from the following report(s) SBAR, Kardex and MAR was reviewed with the receiving nurse. Lines:   Peripheral IV 01/28/17 Left Forearm (Active)   Site Assessment Clean, dry, & intact 1/28/2017 11:11 AM   Phlebitis Assessment 0 1/28/2017 11:11 AM   Infiltration Assessment 0 1/28/2017 11:11 AM   Dressing Status Clean, dry, & intact 1/28/2017 11:11 AM   Dressing Type Transparent;Tape 1/28/2017 11:11 AM   Hub Color/Line Status Pink;Flushed;Patent 1/28/2017 11:11 AM   Alcohol Cap Used No 1/28/2017 11:11 AM        Opportunity for questions and clarification was provided.       Patient transported with:   PaeDae

## 2017-01-28 NOTE — ED TRIAGE NOTES
Pt began having N+V last pm after dinner,  Pt also has chest pain which is worse w movement, headache present  Sepsis Screening completed    (  )Patient meets SIRS criteria. ( x )Patient does not meet SIRS criteria.       SIRS Criteria is achieved when two or more of the following are present   Temperature < 96.8°F (36°C) or > 100.9°F (38.3°C)   Heart Rate > 90 beats per minute   Respiratory Rate > 20 beats per minute   WBC count > 12,000 or <4,000 or > 10% bands

## 2017-01-29 ENCOUNTER — ANESTHESIA EVENT (OUTPATIENT)
Dept: ENDOSCOPY | Age: 82
DRG: 419 | End: 2017-01-29
Payer: MEDICARE

## 2017-01-29 ENCOUNTER — APPOINTMENT (OUTPATIENT)
Dept: GENERAL RADIOLOGY | Age: 82
DRG: 419 | End: 2017-01-29
Attending: INTERNAL MEDICINE
Payer: MEDICARE

## 2017-01-29 ENCOUNTER — ANESTHESIA (OUTPATIENT)
Dept: ENDOSCOPY | Age: 82
DRG: 419 | End: 2017-01-29
Payer: MEDICARE

## 2017-01-29 LAB
ALBUMIN SERPL BCP-MCNC: 3 G/DL (ref 3.4–5)
ALBUMIN/GLOB SERPL: 1 {RATIO} (ref 0.8–1.7)
ALP SERPL-CCNC: 263 U/L (ref 45–117)
ALT SERPL-CCNC: 528 U/L (ref 13–56)
ANION GAP BLD CALC-SCNC: 7 MMOL/L (ref 3–18)
AST SERPL W P-5'-P-CCNC: 571 U/L (ref 15–37)
ATRIAL RATE: 68 BPM
BASOPHILS # BLD AUTO: 0 K/UL (ref 0–0.06)
BASOPHILS # BLD: 0 % (ref 0–2)
BILIRUB SERPL-MCNC: 4.2 MG/DL (ref 0.2–1)
BUN SERPL-MCNC: 8 MG/DL (ref 7–18)
BUN/CREAT SERPL: 11 (ref 12–20)
CALCIUM SERPL-MCNC: 8.4 MG/DL (ref 8.5–10.1)
CALCULATED P AXIS, ECG09: 58 DEGREES
CALCULATED R AXIS, ECG10: -31 DEGREES
CALCULATED T AXIS, ECG11: 55 DEGREES
CHLORIDE SERPL-SCNC: 109 MMOL/L (ref 100–108)
CO2 SERPL-SCNC: 27 MMOL/L (ref 21–32)
CREAT SERPL-MCNC: 0.74 MG/DL (ref 0.6–1.3)
DIAGNOSIS, 93000: NORMAL
DIFFERENTIAL METHOD BLD: ABNORMAL
EOSINOPHIL # BLD: 0.1 K/UL (ref 0–0.4)
EOSINOPHIL NFR BLD: 1 % (ref 0–5)
ERYTHROCYTE [DISTWIDTH] IN BLOOD BY AUTOMATED COUNT: 14.6 % (ref 11.6–14.5)
GLOBULIN SER CALC-MCNC: 3 G/DL (ref 2–4)
GLUCOSE BLD STRIP.AUTO-MCNC: 151 MG/DL (ref 70–110)
GLUCOSE SERPL-MCNC: 112 MG/DL (ref 74–99)
HCT VFR BLD AUTO: 34.1 % (ref 35–45)
HGB BLD-MCNC: 10.9 G/DL (ref 12–16)
LIPASE SERPL-CCNC: 183 U/L (ref 73–393)
LYMPHOCYTES # BLD AUTO: 13 % (ref 21–52)
LYMPHOCYTES # BLD: 0.8 K/UL (ref 0.9–3.6)
MCH RBC QN AUTO: 27.7 PG (ref 24–34)
MCHC RBC AUTO-ENTMCNC: 32 G/DL (ref 31–37)
MCV RBC AUTO: 86.8 FL (ref 74–97)
MONOCYTES # BLD: 0.8 K/UL (ref 0.05–1.2)
MONOCYTES NFR BLD AUTO: 13 % (ref 3–10)
NEUTS SEG # BLD: 4.2 K/UL (ref 1.8–8)
NEUTS SEG NFR BLD AUTO: 73 % (ref 40–73)
P-R INTERVAL, ECG05: 214 MS
PLATELET # BLD AUTO: 224 K/UL (ref 135–420)
PMV BLD AUTO: 9.8 FL (ref 9.2–11.8)
POTASSIUM SERPL-SCNC: 4 MMOL/L (ref 3.5–5.5)
PROT SERPL-MCNC: 6 G/DL (ref 6.4–8.2)
Q-T INTERVAL, ECG07: 428 MS
QRS DURATION, ECG06: 94 MS
QTC CALCULATION (BEZET), ECG08: 455 MS
RBC # BLD AUTO: 3.93 M/UL (ref 4.2–5.3)
SODIUM SERPL-SCNC: 143 MMOL/L (ref 136–145)
VENTRICULAR RATE, ECG03: 68 BPM
WBC # BLD AUTO: 5.9 K/UL (ref 4.6–13.2)

## 2017-01-29 PROCEDURE — 77030020256 HC SOL INJ NACL 0.9%  500ML: Performed by: INTERNAL MEDICINE

## 2017-01-29 PROCEDURE — 65270000029 HC RM PRIVATE

## 2017-01-29 PROCEDURE — 36415 COLL VENOUS BLD VENIPUNCTURE: CPT | Performed by: SURGERY

## 2017-01-29 PROCEDURE — 74011250636 HC RX REV CODE- 250/636: Performed by: SURGERY

## 2017-01-29 PROCEDURE — 76040000007: Performed by: INTERNAL MEDICINE

## 2017-01-29 PROCEDURE — 77030032490 HC SLV COMPR SCD KNE COVD -B: Performed by: INTERNAL MEDICINE

## 2017-01-29 PROCEDURE — 0FC88ZZ EXTIRPATION OF MATTER FROM CYSTIC DUCT, VIA NATURAL OR ARTIFICIAL OPENING ENDOSCOPIC: ICD-10-PCS | Performed by: INTERNAL MEDICINE

## 2017-01-29 PROCEDURE — 74011000250 HC RX REV CODE- 250: Performed by: INTERNAL MEDICINE

## 2017-01-29 PROCEDURE — 83690 ASSAY OF LIPASE: CPT | Performed by: SURGERY

## 2017-01-29 PROCEDURE — 82962 GLUCOSE BLOOD TEST: CPT

## 2017-01-29 PROCEDURE — BF131ZZ FLUOROSCOPY OF GALLBLADDER AND BILE DUCTS USING LOW OSMOLAR CONTRAST: ICD-10-PCS | Performed by: INTERNAL MEDICINE

## 2017-01-29 PROCEDURE — 74330 X-RAY BILE/PANC ENDOSCOPY: CPT

## 2017-01-29 PROCEDURE — 77030031313 HC SPHNTOM CLEVER CUT OCOA -C: Performed by: INTERNAL MEDICINE

## 2017-01-29 PROCEDURE — 74011250636 HC RX REV CODE- 250/636

## 2017-01-29 PROCEDURE — C1769 GUIDE WIRE: HCPCS | Performed by: INTERNAL MEDICINE

## 2017-01-29 PROCEDURE — 80053 COMPREHEN METABOLIC PANEL: CPT | Performed by: SURGERY

## 2017-01-29 PROCEDURE — 77030006973 HC BSKT BILI RTVR OCOA -C: Performed by: INTERNAL MEDICINE

## 2017-01-29 PROCEDURE — 85025 COMPLETE CBC W/AUTO DIFF WBC: CPT | Performed by: SURGERY

## 2017-01-29 PROCEDURE — 74011000250 HC RX REV CODE- 250

## 2017-01-29 PROCEDURE — 74011250637 HC RX REV CODE- 250/637: Performed by: SURGERY

## 2017-01-29 PROCEDURE — 74011250637 HC RX REV CODE- 250/637: Performed by: INTERNAL MEDICINE

## 2017-01-29 PROCEDURE — 76060000032 HC ANESTHESIA 0.5 TO 1 HR: Performed by: INTERNAL MEDICINE

## 2017-01-29 RX ORDER — SODIUM CHLORIDE, SODIUM LACTATE, POTASSIUM CHLORIDE, CALCIUM CHLORIDE 600; 310; 30; 20 MG/100ML; MG/100ML; MG/100ML; MG/100ML
125 INJECTION, SOLUTION INTRAVENOUS CONTINUOUS
Status: CANCELLED | OUTPATIENT
Start: 2017-01-29

## 2017-01-29 RX ORDER — SODIUM CHLORIDE 0.9 % (FLUSH) 0.9 %
5-10 SYRINGE (ML) INJECTION AS NEEDED
Status: CANCELLED | OUTPATIENT
Start: 2017-01-29

## 2017-01-29 RX ORDER — LIDOCAINE HYDROCHLORIDE 20 MG/ML
INJECTION, SOLUTION EPIDURAL; INFILTRATION; INTRACAUDAL; PERINEURAL AS NEEDED
Status: DISCONTINUED | OUTPATIENT
Start: 2017-01-29 | End: 2017-01-29 | Stop reason: HOSPADM

## 2017-01-29 RX ORDER — PROPOFOL 10 MG/ML
INJECTION, EMULSION INTRAVENOUS AS NEEDED
Status: DISCONTINUED | OUTPATIENT
Start: 2017-01-29 | End: 2017-01-29 | Stop reason: HOSPADM

## 2017-01-29 RX ORDER — PROPOFOL 10 MG/ML
VIAL (ML) INTRAVENOUS
Status: DISCONTINUED | OUTPATIENT
Start: 2017-01-29 | End: 2017-01-29

## 2017-01-29 RX ORDER — SODIUM CHLORIDE, SODIUM LACTATE, POTASSIUM CHLORIDE, CALCIUM CHLORIDE 600; 310; 30; 20 MG/100ML; MG/100ML; MG/100ML; MG/100ML
INJECTION, SOLUTION INTRAVENOUS
Status: DISCONTINUED | OUTPATIENT
Start: 2017-01-29 | End: 2017-01-29 | Stop reason: HOSPADM

## 2017-01-29 RX ORDER — FENTANYL CITRATE 50 UG/ML
INJECTION, SOLUTION INTRAMUSCULAR; INTRAVENOUS AS NEEDED
Status: DISCONTINUED | OUTPATIENT
Start: 2017-01-29 | End: 2017-01-29 | Stop reason: HOSPADM

## 2017-01-29 RX ORDER — MIDAZOLAM HYDROCHLORIDE 1 MG/ML
INJECTION, SOLUTION INTRAMUSCULAR; INTRAVENOUS AS NEEDED
Status: DISCONTINUED | OUTPATIENT
Start: 2017-01-29 | End: 2017-01-29 | Stop reason: HOSPADM

## 2017-01-29 RX ORDER — ONDANSETRON 2 MG/ML
INJECTION INTRAMUSCULAR; INTRAVENOUS AS NEEDED
Status: DISCONTINUED | OUTPATIENT
Start: 2017-01-29 | End: 2017-01-29 | Stop reason: HOSPADM

## 2017-01-29 RX ORDER — FENTANYL CITRATE 50 UG/ML
50 INJECTION, SOLUTION INTRAMUSCULAR; INTRAVENOUS
Status: CANCELLED | OUTPATIENT
Start: 2017-01-29

## 2017-01-29 RX ORDER — PROPOFOL 10 MG/ML
INJECTION, EMULSION INTRAVENOUS
Status: DISCONTINUED | OUTPATIENT
Start: 2017-01-29 | End: 2017-01-29 | Stop reason: HOSPADM

## 2017-01-29 RX ADMIN — ATENOLOL 25 MG: 25 TABLET ORAL at 10:11

## 2017-01-29 RX ADMIN — PROPOFOL 50 MCG/KG/MIN: 10 INJECTION, EMULSION INTRAVENOUS at 11:46

## 2017-01-29 RX ADMIN — FENTANYL CITRATE 25 MCG: 50 INJECTION, SOLUTION INTRAMUSCULAR; INTRAVENOUS at 11:51

## 2017-01-29 RX ADMIN — MIDAZOLAM HYDROCHLORIDE 2 MG: 1 INJECTION, SOLUTION INTRAMUSCULAR; INTRAVENOUS at 11:32

## 2017-01-29 RX ADMIN — DONEPEZIL HYDROCHLORIDE 5 MG: 5 TABLET, FILM COATED ORAL at 23:26

## 2017-01-29 RX ADMIN — ONDANSETRON 4 MG: 2 INJECTION INTRAMUSCULAR; INTRAVENOUS at 12:09

## 2017-01-29 RX ADMIN — DEXTROSE MONOHYDRATE, SODIUM CHLORIDE, AND POTASSIUM CHLORIDE 100 ML/HR: 50; 4.5; 1.49 INJECTION, SOLUTION INTRAVENOUS at 15:37

## 2017-01-29 RX ADMIN — FENTANYL CITRATE 25 MCG: 50 INJECTION, SOLUTION INTRAMUSCULAR; INTRAVENOUS at 11:49

## 2017-01-29 RX ADMIN — PROPOFOL 20 MG: 10 INJECTION, EMULSION INTRAVENOUS at 11:49

## 2017-01-29 RX ADMIN — PROPOFOL 20 MG: 10 INJECTION, EMULSION INTRAVENOUS at 11:51

## 2017-01-29 RX ADMIN — LIDOCAINE HYDROCHLORIDE 60 MG: 20 INJECTION, SOLUTION EPIDURAL; INFILTRATION; INTRACAUDAL; PERINEURAL at 11:40

## 2017-01-29 RX ADMIN — GABAPENTIN 100 MG: 100 CAPSULE ORAL at 18:15

## 2017-01-29 RX ADMIN — SODIUM CHLORIDE, SODIUM LACTATE, POTASSIUM CHLORIDE, CALCIUM CHLORIDE: 600; 310; 30; 20 INJECTION, SOLUTION INTRAVENOUS at 11:26

## 2017-01-29 RX ADMIN — ACETAMINOPHEN 650 MG: 325 TABLET, FILM COATED ORAL at 23:26

## 2017-01-29 NOTE — PROGRESS NOTES
conducted an initial consultation and Spiritual Assessment for Jimmy Jacob, who is a 80 y.o.,female. Patients Primary Language is: [place]. According to the patients EMR Nondenominational Affiliation is: [place]. The reason the Patient came to the hospital is:   Patient Active Problem List    Diagnosis Date Noted    Choledocholithiasis with acute cholecystitis 01/28/2017    Cholecystitis, acute with cholelithiasis 01/28/2017        The  provided the following Interventions:  Initiated a relationship of care and support. Explored issues of radha, belief, spirituality and Sikh/ritual needs while hospitalized. Listened empathically. Provided chaplaincy education. Provided information about Spiritual Care Services. Offered prayer and assurance of continued prayers on patient's behalf. Chart reviewed. The following outcomes were achieved:  Patient shared limited information about both their medical narrative and spiritual journey/beliefs. Patient processed feeling about current hospitalization. Patient expressed gratitude for the 's visit. Assessment:  Patient does not have any Sikh/cultural needs that will affect patients preferences in health care. Plan:  Chaplains will continue to follow and will provide pastoral care on an as needed/requested basis.  recommends bedside caregivers page  on duty if patient shows signs of acute spiritual or emotional distress.     Chaplain Paige Pore

## 2017-01-29 NOTE — ROUTINE PROCESS
TRANSFER - IN REPORT:    Verbal report received from KECIA Izquierdo RN(name) on Rohan  being received from ED(unit) for routine progression of care      Report consisted of patients Situation, Background, Assessment and   Recommendations(SBAR). Information from the following report(s) SBAR, Kardex, Intake/Output and MAR was reviewed with the receiving nurse. Opportunity for questions and clarification was provided. Assessment completed upon patients arrival to unit and care assumed.

## 2017-01-29 NOTE — PROGRESS NOTES
Received report from CHANTEL Valdez. 1015 patient assessment was completed. Patient states that she is not having any pain at this time. 1145 patient went down to the ENDO lab. 1330 patient came back from the ENDO. Shift summary - patient went down and had a ERCP when the patient returned to the floor there were no complications.

## 2017-01-29 NOTE — PROGRESS NOTES
Gastrointestinal Progress Note    Patient Name: Shannan Brewer    GICYS'G Date: 1/29/2017    Admit Date: 1/28/2017    Subjective:     Diet: Patient is on NPO and is tolerating. Nausea is not present. Vomiting is not present. Pain: Patient does not complain of abdominal pain.  No longer she has abdominal or substernal chest pain  Bowel Movements: None    Bleeding:  None    Current Facility-Administered Medications   Medication Dose Route Frequency    benzocaine (HURRICANE) 20 % spray   Mucous Membrane ONCE    glucagon (GLUCAGEN) injection 0.5 mg  0.5 mg IntraVENous PRN    indomethacin (INDOCIN) rectal suppository 50 mg  50 mg Rectal ENDO ONCE    dextrose 5% - 0.45% NaCl with KCl 20 mEq/L infusion  100 mL/hr IntraVENous CONTINUOUS    sodium chloride (NS) flush 5-10 mL  5-10 mL IntraVENous Q8H    sodium chloride (NS) flush 5-10 mL  5-10 mL IntraVENous PRN    acetaminophen (TYLENOL) tablet 650 mg  650 mg Oral Q4H PRN    oxyCODONE-acetaminophen (PERCOCET) 5-325 mg per tablet 1 Tab  1 Tab Oral Q4H PRN    HYDROmorphone (PF) (DILAUDID) injection 1 mg  1 mg IntraVENous Q3H PRN    acetaminophen (TYLENOL) tablet 650 mg  650 mg Oral Q4H PRN    ondansetron (ZOFRAN) injection 4 mg  4 mg IntraVENous Q4H PRN    diphenhydrAMINE (BENADRYL) injection 12.5 mg  12.5 mg IntraVENous Q4H PRN    amLODIPine (NORVASC) tablet 2.5 mg  2.5 mg Oral DAILY    atenolol (TENORMIN) tablet 25 mg  25 mg Oral DAILY    citalopram (CELEXA) tablet 20 mg  20 mg Oral DAILY    donepezil (ARICEPT) tablet 5 mg  5 mg Oral QHS    gabapentin (NEURONTIN) capsule 100 mg  100 mg Oral TID    pantoprazole (PROTONIX) tablet 40 mg  40 mg Oral DAILY          Objective:     Visit Vitals    /76 (BP 1 Location: Right arm, BP Patient Position: Post activity)    Pulse 66    Temp 97.8 °F (36.6 °C)    Resp 16    Ht 5' 2\" (1.575 m)    Wt 64.9 kg (143 lb)    SpO2 94%    BMI 26.15 kg/m2       01/27 1901 - 01/29 0700  In: 1128.3 [I.V.:1128.3]  Out: 100 [Urine:100]    General appearance: alert, cooperative, no distress, appears stated age, Oriented x 3  Heart: regular rate and rhythm, S1, S2 normal, no murmur, click, rub or gallop  Abdomen: soft, non-tender. Bowel sounds normal. No masses,  no organomegaly    Data Review:    Labs: Results:       Chemistry Recent Labs      01/29/17   0800  01/28/17   1107   GLU  112*  127*   NA  143  138   K  4.0  4.0   CL  109*  102   CO2  27  26   BUN  8  16   CREA  0.74  0.72   CA  8.4*  8.5   AGAP  7  10   BUCR  11*  22*   AP  263*  212*   TP  6.0*  6.9   ALB  3.0*  3.6   GLOB  3.0  3.3   AGRAT  1.0  1.1      CBC w/Diff Recent Labs      01/29/17   0800  01/28/17   1107   WBC  5.9  11.4   RBC  3.93*  4.48   HGB  10.9*  12.6   HCT  34.1*  38.5   PLT  224  267   GRANS  73  88*   LYMPH  13*  7*   EOS  1  0      Coagulation Recent Labs      01/28/17   1107   PTP  12.5   INR  1.0   APTT  26.2       Liver Enzymes Recent Labs      01/29/17   0800   TP  6.0*   ALB  3.0*   AP  263*   SGOT  571*          Assessment:     Active Problems:    Choledocholithiasis with acute cholecystitis (1/28/2017)      Cholecystitis, acute with cholelithiasis (1/28/2017)        Plan:     Choledocholithiasis and cholecystitis pain has abated, but still with LFT elevation and higher Bilirubin to 4 Plan ERCP now. Cholecystectomy tomorrow by Dr Favian Segovia. I explained once again to the patient the ERCP and the risk involved which include but not limited to Pancreatitis and sometimes severe, bleeding, perforation, infection or not being able to cannulate or remove the stone in this session where she may require another procedure. She is already on Mefoxin. I would give her Indomethacin suppository 50 mg only because of her age. History of left breast cancer many years ago.   ETOH 2 to 3 drinks per night may be too much for her age      Alva Araya MD  January 29, 2017

## 2017-01-29 NOTE — ANESTHESIA PREPROCEDURE EVALUATION
Anesthetic History   No history of anesthetic complications            Review of Systems / Medical History  Patient summary reviewed, nursing notes reviewed and pertinent labs reviewed    Pulmonary  Within defined limits                 Neuro/Psych   Within defined limits           Cardiovascular    Hypertension              Exercise tolerance: >4 METS     GI/Hepatic/Renal  Within defined limits              Endo/Other  Within defined limits           Other Findings              Physical Exam    Airway  Mallampati: III  TM Distance: < 4 cm  Neck ROM: decreased range of motion   Mouth opening: Normal     Cardiovascular  Regular rate and rhythm,  S1 and S2 normal,  no murmur, click, rub, or gallop  Rhythm: regular  Rate: normal         Dental    Dentition: Caps/crowns     Pulmonary  Breath sounds clear to auscultation               Abdominal  GI exam deferred       Other Findings            Anesthetic Plan    ASA: 2, emergent  Anesthesia type: MAC            Anesthetic plan and risks discussed with: Patient

## 2017-01-29 NOTE — ANESTHESIA POSTPROCEDURE EVALUATION
Post-Anesthesia Evaluation and Assessment    Cardiovascular Function/Vital Signs  Visit Vitals    /57    Pulse (!) 58    Temp 37.2 °C (98.9 °F)    Resp 16    Ht 5' 2\" (1.575 m)    Wt 64.9 kg (143 lb)    SpO2 93%    BMI 26.15 kg/m2       Patient is status post Procedure(s):  ENDOSCOPIC RETROGRADE CHOLANGIOPANCREATOGRAPHY, SPHINCTEROTOMY, BASKET REMOVAL COMMON BILE DUCT STONES. Nausea/Vomiting: Controlled. Postoperative hydration reviewed and adequate. Pain:  Pain Scale 1: Numeric (0 - 10) (01/29/17 1235)  Pain Intensity 1: 0 (01/29/17 1235)   Managed. Neurological Status:   Neuro (WDL): Within Defined Limits (01/29/17 1235)   At baseline. Mental Status and Level of Consciousness: Arouses to verbal.  Converses appropriately. Pulmonary Status:   O2 Device: Nasal cannula (01/29/17 1235)   Adequate oxygenation and airway patent. Complications related to anesthesia: None    Post-anesthesia assessment completed. No concerns. Patient has met all discharge requirements.     Signed By: Dilma Harris CRNA    January 29, 2017

## 2017-01-29 NOTE — PROCEDURES
80 yp female was admitted yesterday for epigastric pain started 2 days ago with nausea and vomiting. Ref. Range 1/28/2017 11:07 1/29/2017 08:00   Bilirubin, total Latest Ref Range: 0.2 - 1.0 MG/DL 2.7 (H) 4.2 (H)      Ref. Range 1/28/2017 11:07 1/29/2017 08:00   ALT Latest Ref Range: 13 - 56 U/L 589 (H) 528 (H)   AST Latest Ref Range: 15 - 37 U/L 1206 (H) 571 (H)   Alk. phosphatase Latest Ref Range: 45 - 117 U/L 212 (H) 263 (H)   Lipase Latest Ref Range: 73 - 393 U/L 457 (H) 183   . US and CT scan of the abdomen cholecystitis with dilated intra and extra hepatic bile ducts up to 13 mm. Today she is feeling better. Under MAC sedation the patient was postionned in the left semi prone position in the OR. The side viewing duodenoscope was introduced through the mouth and advance to the 3 ed portion of the duodenum. The esophagus is remarkable for 5 cm hiatal hernia. The stomach is normal. The Pylorus, Bulb, second portion of the duodenum and the major papilla is insdside the diverticulum. Selective cannulation of the CBD is obtained with mild difficulty with the .025 wire guided olympus taperpapillotome, opening is very tight. CBD is dilated with few stones but the distal part is relatively narrow. Intrahepatic branches are slightly dilated. The cystic duct initially was obstructed but later it opened up revaling a distended gallbladder with multiple large stones and sludge. The pancreatic duct was never injected or cannulated. Sphincterotomy done up to 7 using a blended current Endocut mode level 1. The stones, debris and sludge then cleared repeatedly with the regular tetra basket. No specimen removed  ESBL is nil  No immediate complication    IMPRESSION: 5 cm hiatal hernia. Very small ampullary opening. Mild dilatation of the intra and extra hepatic biliary tree with few medium sized choledocholithiasis. Acute and chronic cholecystitis. Tomorrow cholecystectomy as planned.    I expect all liver enzymes (LFT) to return to normal soon.

## 2017-01-29 NOTE — PERIOP NOTES
3 WHITE METAL BRACELETS AND 2 WHITE METAL RINGS, ALL WITHOUT STONES, REMOVED PRE OP AND PLACED ON PRE OP HOLDING SAFE WITH PATIENT'S PERMISSION. ON WHITE METAL BRACELET WITH CHARMS COULD NOT BE REMOVED. Jewelry listed above removed from safe and taped to front of patient's chart post procedure.  PACU RN Ayanna Resendez received chart and jewelry

## 2017-01-29 NOTE — PROGRESS NOTES
Received patient from the ED staff. Patient was resting in the bed no distress was noted at this time. Respirations were even and unlabored no needs were voiced at this time. Shift summary-  Admission database was completed at this time. Fluids were started and patient was resting in the bed no distress was noted. Patient son was notified of the time of the procedure via voicemail.

## 2017-01-29 NOTE — ROUTINE PROCESS
Bedside and Verbal shift change report given to CHANTEL Reynoso RN (oncoming nurse) by Alexis Garcia. Yaneli Ashton RN (offgoing nurse). Report included the following information SBAR, Kardex, Intake/Output and MAR.    Visit Vitals    /49 (BP 1 Location: Right arm, BP Patient Position: At rest)    Pulse 66    Temp 98.1 °F (36.7 °C)    Resp 16    Ht 5' 2\" (1.575 m)    Wt 64.9 kg (143 lb)    SpO2 93%    BMI 26.16 kg/m2

## 2017-01-29 NOTE — H&P
History & Physical    Patient: Teresa Aguilar MRN: [id number]  CSN: [id number]    YOB: 1927  Age: 80 y.o. Sex: female      DOA: 1/28/2017       HPI:     Teresa Aguilar is a 80 y.o. female who presented to ER with a several day h/o abdominal pain, imaging showed gallstones, dilated CBD and elevated LFT's including bilirubin. Pain is better, no N/V, h/o daily social drinking, she lives in [place]    Past Medical History   Diagnosis Date    Hypertension        History reviewed. No pertinent past surgical history. History reviewed. No pertinent family history. Social History     Social History    Marital status:      Spouse name: N/A    Number of children: N/A    Years of education: N/A     Social History Main Topics    Smoking status: Never Smoker    Smokeless tobacco: None    Alcohol use Yes    Drug use: None    Sexual activity: Not Asked     Other Topics Concern    None     Social History Narrative    None       Prior to Admission medications    Medication Sig Start Date End Date Taking? Authorizing Provider   amLODIPine (NORVASC) 2.5 mg tablet Take 2.5 mg by mouth daily. Yes Ashleigh Doe MD   atenolol (TENORMIN) 25 mg tablet Take 25 mg by mouth daily. Yes Ashleigh Doe MD   citalopram (CELEXA) 20 mg tablet Take 20 mg by mouth daily. Yes Ashleigh Doe MD   donepezil (ARICEPT) 5 mg tablet Take 5 mg by mouth nightly. Yes Ashleigh Doe MD   gabapentin (NEURONTIN) 100 mg capsule Take 100 mg by mouth three (3) times daily. Yes Ashleigh Doe MD   omeprazole (PRILOSEC) 40 mg capsule Take 40 mg by mouth daily.    Yes Ashleigh Doe MD       No Known Allergies    Review of Systems  no angina, SOB, sz, urinary sx, sputum, wheezing      Physical Exam:      Visit Vitals    /76 (BP 1 Location: Right arm, BP Patient Position: Post activity)    Pulse 66    Temp 97.8 °F (36.6 °C)    Resp 16    Ht 5' 2\" (1.575 m)    Wt 64.9 kg (143 lb)    SpO2 94%    BMI 26.15 kg/m2       Physical Exam:  age appropriate, awake and alert, no distress, icteric, skin warm and dry, RRR, nl resp effort, abd flat with minimal tenderness RUQ, grossly nl neuro    Labs Reviewed: All lab results for the last 24 hours reviewed.     Assessment/Plan     Active Problems:    Choledocholithiasis with acute cholecystitis (1/28/2017)      Cholecystitis, acute with cholelithiasis (1/28/2017)        For ERCP today and lap jose tomorrow    Stacy Stewart MD  January 29, 2017

## 2017-01-29 NOTE — ROUTINE PROCESS
TRANSFER - IN REPORT:    Verbal report received from NOY Fischer RN (name) on Shannan Brewer  being received from [employer]) for routine post - op      Report consisted of patients Situation, Background, Assessment and   Recommendations(SBAR). Information from the following report(s) SBAR, Kardex, Intake/Output and MAR was reviewed with the receiving nurse. Opportunity for questions and clarification was provided. Assessment completed upon patients arrival to unit and care assumed.

## 2017-01-29 NOTE — PERIOP NOTES
Transferred pt back to room 316 receiving nurse Lorena Thomson. Pts is awake and alert. Vaughny ( 3 bracelets and 2 rings given to [place] and she will give to family.

## 2017-01-29 NOTE — ROUTINE PROCESS
TRANSFER - OUT REPORT:    Verbal report given to [facility] and [employer]. RN(name) on Reed Marques  being transferred to ENDO(unit) for routine post - op       Report consisted of patients Situation, Background, Assessment and   Recommendations(SBAR). Information from the following report(s) SBAR, Kardex, Intake/Output and MAR was reviewed with the receiving nurse. Lines:   Peripheral IV 01/29/17 Left Forearm (Active)   Site Assessment Clean, dry, & intact 1/29/2017  5:04 PM   Phlebitis Assessment 0 1/29/2017  5:04 PM   Infiltration Assessment 0 1/29/2017  5:04 PM   Dressing Status Clean, dry, & intact 1/29/2017  5:04 PM   Dressing Type Tape;Transparent 1/29/2017  5:04 PM   Hub Color/Line Status Flushed;Patent; Infusing 1/29/2017  5:04 PM   Action Taken Open ports on tubing capped 1/29/2017  5:04 PM   Alcohol Cap Used Yes 1/29/2017  5:04 PM        Opportunity for questions and clarification was provided.       Patient transported with:   [employer]

## 2017-01-29 NOTE — PROGRESS NOTES
Patient VSS. Resting comfortably. Voiced understanding of NPO status. Denies pain. Ready for surgery. No acute events. Patient Vitals for the past 12 hrs:   Temp Pulse Resp BP SpO2   01/29/17 0357 98.1 °F (36.7 °C) 63 16 144/54 91 %   01/28/17 2302 97.7 °F (36.5 °C) 69 16 141/62 95 %   01/28/17 1956 98.1 °F (36.7 °C) 66 16 112/49 93 %   01/28/17 1817 98.2 °F (36.8 °C) 68 16 139/51 90 %     Bedside and Verbal shift change report given to N. Epimenio Ormond (oncoming nurse) by Gee Murillo RN   (offgoing nurse). Report included the following information SBAR, Kardex and MAR.

## 2017-01-30 ENCOUNTER — ANESTHESIA EVENT (OUTPATIENT)
Dept: SURGERY | Age: 82
DRG: 419 | End: 2017-01-30
Payer: MEDICARE

## 2017-01-30 ENCOUNTER — ANESTHESIA (OUTPATIENT)
Dept: SURGERY | Age: 82
DRG: 419 | End: 2017-01-30
Payer: MEDICARE

## 2017-01-30 LAB
ALBUMIN SERPL BCP-MCNC: 3.2 G/DL (ref 3.4–5)
ALBUMIN/GLOB SERPL: 1 {RATIO} (ref 0.8–1.7)
ALP SERPL-CCNC: 292 U/L (ref 45–117)
ALT SERPL-CCNC: 424 U/L (ref 13–56)
ANION GAP BLD CALC-SCNC: 5 MMOL/L (ref 3–18)
AST SERPL W P-5'-P-CCNC: 313 U/L (ref 15–37)
BASOPHILS # BLD AUTO: 0 K/UL (ref 0–0.06)
BASOPHILS # BLD: 1 % (ref 0–2)
BILIRUB SERPL-MCNC: 3 MG/DL (ref 0.2–1)
BUN SERPL-MCNC: 3 MG/DL (ref 7–18)
BUN/CREAT SERPL: 4 (ref 12–20)
CALCIUM SERPL-MCNC: 8.5 MG/DL (ref 8.5–10.1)
CHLORIDE SERPL-SCNC: 108 MMOL/L (ref 100–108)
CO2 SERPL-SCNC: 28 MMOL/L (ref 21–32)
CREAT SERPL-MCNC: 0.76 MG/DL (ref 0.6–1.3)
DIFFERENTIAL METHOD BLD: ABNORMAL
EOSINOPHIL # BLD: 0.2 K/UL (ref 0–0.4)
EOSINOPHIL NFR BLD: 3 % (ref 0–5)
ERYTHROCYTE [DISTWIDTH] IN BLOOD BY AUTOMATED COUNT: 14.8 % (ref 11.6–14.5)
GLOBULIN SER CALC-MCNC: 3.1 G/DL (ref 2–4)
GLUCOSE SERPL-MCNC: 113 MG/DL (ref 74–99)
HCT VFR BLD AUTO: 35.7 % (ref 35–45)
HGB BLD-MCNC: 11.3 G/DL (ref 12–16)
LIPASE SERPL-CCNC: 150 U/L (ref 73–393)
LYMPHOCYTES # BLD AUTO: 13 % (ref 21–52)
LYMPHOCYTES # BLD: 0.7 K/UL (ref 0.9–3.6)
MCH RBC QN AUTO: 27.6 PG (ref 24–34)
MCHC RBC AUTO-ENTMCNC: 31.7 G/DL (ref 31–37)
MCV RBC AUTO: 87.3 FL (ref 74–97)
MONOCYTES # BLD: 0.6 K/UL (ref 0.05–1.2)
MONOCYTES NFR BLD AUTO: 11 % (ref 3–10)
NEUTS SEG # BLD: 3.9 K/UL (ref 1.8–8)
NEUTS SEG NFR BLD AUTO: 72 % (ref 40–73)
PLATELET # BLD AUTO: 226 K/UL (ref 135–420)
PMV BLD AUTO: 10.1 FL (ref 9.2–11.8)
POTASSIUM SERPL-SCNC: 4.5 MMOL/L (ref 3.5–5.5)
PROT SERPL-MCNC: 6.3 G/DL (ref 6.4–8.2)
RBC # BLD AUTO: 4.09 M/UL (ref 4.2–5.3)
SODIUM SERPL-SCNC: 141 MMOL/L (ref 136–145)
WBC # BLD AUTO: 5.5 K/UL (ref 4.6–13.2)

## 2017-01-30 PROCEDURE — 77030010030: Performed by: SURGERY

## 2017-01-30 PROCEDURE — 74011250636 HC RX REV CODE- 250/636: Performed by: ANESTHESIOLOGY

## 2017-01-30 PROCEDURE — 74011250637 HC RX REV CODE- 250/637: Performed by: SURGERY

## 2017-01-30 PROCEDURE — 74011250636 HC RX REV CODE- 250/636: Performed by: SURGERY

## 2017-01-30 PROCEDURE — 77030009403 HC ELECTRD ENDO MEGA -B: Performed by: SURGERY

## 2017-01-30 PROCEDURE — 0FT44ZZ RESECTION OF GALLBLADDER, PERCUTANEOUS ENDOSCOPIC APPROACH: ICD-10-PCS | Performed by: SURGERY

## 2017-01-30 PROCEDURE — 36415 COLL VENOUS BLD VENIPUNCTURE: CPT | Performed by: INTERNAL MEDICINE

## 2017-01-30 PROCEDURE — 88304 TISSUE EXAM BY PATHOLOGIST: CPT | Performed by: SURGERY

## 2017-01-30 PROCEDURE — 77030002935 HC SUT MCRYL J&J -C: Performed by: SURGERY

## 2017-01-30 PROCEDURE — 65270000029 HC RM PRIVATE

## 2017-01-30 PROCEDURE — 77030018836 HC SOL IRR NACL ICUM -A: Performed by: SURGERY

## 2017-01-30 PROCEDURE — 77030020255 HC SOL INJ LR 1000ML BG: Performed by: SURGERY

## 2017-01-30 PROCEDURE — 77030011267 HC ELECTRD BLD COVD -A: Performed by: SURGERY

## 2017-01-30 PROCEDURE — 85025 COMPLETE CBC W/AUTO DIFF WBC: CPT | Performed by: INTERNAL MEDICINE

## 2017-01-30 PROCEDURE — 77030010515 HC APPL ENDOCLP LIG J&J -B: Performed by: SURGERY

## 2017-01-30 PROCEDURE — 76060000033 HC ANESTHESIA 1 TO 1.5 HR: Performed by: SURGERY

## 2017-01-30 PROCEDURE — 77030014008 HC SPNG HEMSTAT J&J -C: Performed by: SURGERY

## 2017-01-30 PROCEDURE — 80053 COMPREHEN METABOLIC PANEL: CPT | Performed by: INTERNAL MEDICINE

## 2017-01-30 PROCEDURE — 77030033271 HC TRCR ENDOSC EPATH2 J&J -B: Performed by: SURGERY

## 2017-01-30 PROCEDURE — 74011250636 HC RX REV CODE- 250/636

## 2017-01-30 PROCEDURE — 77030032490 HC SLV COMPR SCD KNE COVD -B: Performed by: SURGERY

## 2017-01-30 PROCEDURE — 74011000250 HC RX REV CODE- 250

## 2017-01-30 PROCEDURE — 74011000250 HC RX REV CODE- 250: Performed by: ANESTHESIOLOGY

## 2017-01-30 PROCEDURE — 77030003580 HC NDL INSUF VERES J&J -B: Performed by: SURGERY

## 2017-01-30 PROCEDURE — 77030008477 HC STYL SATN SLP COVD -A: Performed by: ANESTHESIOLOGY

## 2017-01-30 PROCEDURE — 77030031139 HC SUT VCRL2 J&J -A: Performed by: SURGERY

## 2017-01-30 PROCEDURE — 77030011640 HC PAD GRND REM COVD -A: Performed by: SURGERY

## 2017-01-30 PROCEDURE — 76010000149 HC OR TIME 1 TO 1.5 HR: Performed by: SURGERY

## 2017-01-30 PROCEDURE — 74011000250 HC RX REV CODE- 250: Performed by: SURGERY

## 2017-01-30 PROCEDURE — 77030008683 HC TU ET CUF COVD -A: Performed by: ANESTHESIOLOGY

## 2017-01-30 PROCEDURE — 83690 ASSAY OF LIPASE: CPT | Performed by: INTERNAL MEDICINE

## 2017-01-30 PROCEDURE — 77030008606 HC TRCR ENDOSC KII AMR -B: Performed by: SURGERY

## 2017-01-30 PROCEDURE — 77030020829: Performed by: SURGERY

## 2017-01-30 PROCEDURE — 76210000019 HC OR PH I REC 4 TO 4.5 HR: Performed by: SURGERY

## 2017-01-30 RX ORDER — DEXTROSE, SODIUM CHLORIDE, AND POTASSIUM CHLORIDE 5; .45; .15 G/100ML; G/100ML; G/100ML
75 INJECTION INTRAVENOUS CONTINUOUS
Status: DISCONTINUED | OUTPATIENT
Start: 2017-01-30 | End: 2017-01-31

## 2017-01-30 RX ORDER — FLUMAZENIL 0.1 MG/ML
0.2 INJECTION INTRAVENOUS
Status: DISCONTINUED | OUTPATIENT
Start: 2017-01-30 | End: 2017-01-30 | Stop reason: HOSPADM

## 2017-01-30 RX ORDER — DEXTROSE 50 % IN WATER (D50W) INTRAVENOUS SYRINGE
25-50 AS NEEDED
Status: DISCONTINUED | OUTPATIENT
Start: 2017-01-30 | End: 2017-01-30 | Stop reason: HOSPADM

## 2017-01-30 RX ORDER — HYDROMORPHONE HYDROCHLORIDE 2 MG/ML
0.5 INJECTION, SOLUTION INTRAMUSCULAR; INTRAVENOUS; SUBCUTANEOUS
Status: DISCONTINUED | OUTPATIENT
Start: 2017-01-30 | End: 2017-01-30 | Stop reason: HOSPADM

## 2017-01-30 RX ORDER — HYDROMORPHONE HYDROCHLORIDE 1 MG/ML
0.5 INJECTION, SOLUTION INTRAMUSCULAR; INTRAVENOUS; SUBCUTANEOUS
Status: DISCONTINUED | OUTPATIENT
Start: 2017-01-30 | End: 2017-01-31 | Stop reason: HOSPADM

## 2017-01-30 RX ORDER — FENTANYL CITRATE 50 UG/ML
50 INJECTION, SOLUTION INTRAMUSCULAR; INTRAVENOUS AS NEEDED
Status: DISCONTINUED | OUTPATIENT
Start: 2017-01-30 | End: 2017-01-30 | Stop reason: HOSPADM

## 2017-01-30 RX ORDER — GLYCOPYRROLATE 0.2 MG/ML
INJECTION INTRAMUSCULAR; INTRAVENOUS AS NEEDED
Status: DISCONTINUED | OUTPATIENT
Start: 2017-01-30 | End: 2017-01-30 | Stop reason: HOSPADM

## 2017-01-30 RX ORDER — SODIUM CHLORIDE, SODIUM LACTATE, POTASSIUM CHLORIDE, CALCIUM CHLORIDE 600; 310; 30; 20 MG/100ML; MG/100ML; MG/100ML; MG/100ML
1000 INJECTION, SOLUTION INTRAVENOUS CONTINUOUS
Status: DISCONTINUED | OUTPATIENT
Start: 2017-01-30 | End: 2017-01-30

## 2017-01-30 RX ORDER — ACETAMINOPHEN 10 MG/ML
1000 INJECTION, SOLUTION INTRAVENOUS ONCE
Status: COMPLETED | OUTPATIENT
Start: 2017-01-30 | End: 2017-01-30

## 2017-01-30 RX ORDER — MAGNESIUM SULFATE 100 %
4 CRYSTALS MISCELLANEOUS AS NEEDED
Status: DISCONTINUED | OUTPATIENT
Start: 2017-01-30 | End: 2017-01-30 | Stop reason: HOSPADM

## 2017-01-30 RX ORDER — FENTANYL CITRATE 50 UG/ML
INJECTION, SOLUTION INTRAMUSCULAR; INTRAVENOUS AS NEEDED
Status: DISCONTINUED | OUTPATIENT
Start: 2017-01-30 | End: 2017-01-30 | Stop reason: HOSPADM

## 2017-01-30 RX ORDER — SODIUM CHLORIDE 0.9 % (FLUSH) 0.9 %
5-10 SYRINGE (ML) INJECTION AS NEEDED
Status: DISCONTINUED | OUTPATIENT
Start: 2017-01-30 | End: 2017-01-30 | Stop reason: HOSPADM

## 2017-01-30 RX ORDER — NEOSTIGMINE METHYLSULFATE 5 MG/5 ML
SYRINGE (ML) INTRAVENOUS AS NEEDED
Status: DISCONTINUED | OUTPATIENT
Start: 2017-01-30 | End: 2017-01-30 | Stop reason: HOSPADM

## 2017-01-30 RX ORDER — LABETALOL HYDROCHLORIDE 5 MG/ML
10 INJECTION, SOLUTION INTRAVENOUS
Status: DISCONTINUED | OUTPATIENT
Start: 2017-01-30 | End: 2017-01-30 | Stop reason: HOSPADM

## 2017-01-30 RX ORDER — NALOXONE HYDROCHLORIDE 0.4 MG/ML
0.1 INJECTION, SOLUTION INTRAMUSCULAR; INTRAVENOUS; SUBCUTANEOUS AS NEEDED
Status: DISCONTINUED | OUTPATIENT
Start: 2017-01-30 | End: 2017-01-30 | Stop reason: HOSPADM

## 2017-01-30 RX ORDER — BUPIVACAINE HYDROCHLORIDE 5 MG/ML
INJECTION, SOLUTION EPIDURAL; INTRACAUDAL AS NEEDED
Status: DISCONTINUED | OUTPATIENT
Start: 2017-01-30 | End: 2017-01-30 | Stop reason: HOSPADM

## 2017-01-30 RX ORDER — ROCURONIUM BROMIDE 10 MG/ML
INJECTION, SOLUTION INTRAVENOUS AS NEEDED
Status: DISCONTINUED | OUTPATIENT
Start: 2017-01-30 | End: 2017-01-30 | Stop reason: HOSPADM

## 2017-01-30 RX ORDER — ONDANSETRON 2 MG/ML
INJECTION INTRAMUSCULAR; INTRAVENOUS AS NEEDED
Status: DISCONTINUED | OUTPATIENT
Start: 2017-01-30 | End: 2017-01-30 | Stop reason: HOSPADM

## 2017-01-30 RX ORDER — LIDOCAINE HYDROCHLORIDE 20 MG/ML
INJECTION, SOLUTION EPIDURAL; INFILTRATION; INTRACAUDAL; PERINEURAL AS NEEDED
Status: DISCONTINUED | OUTPATIENT
Start: 2017-01-30 | End: 2017-01-30 | Stop reason: HOSPADM

## 2017-01-30 RX ORDER — LABETALOL HYDROCHLORIDE 5 MG/ML
INJECTION, SOLUTION INTRAVENOUS
Status: DISPENSED
Start: 2017-01-30 | End: 2017-01-31

## 2017-01-30 RX ORDER — PROPOFOL 10 MG/ML
INJECTION, EMULSION INTRAVENOUS AS NEEDED
Status: DISCONTINUED | OUTPATIENT
Start: 2017-01-30 | End: 2017-01-30 | Stop reason: HOSPADM

## 2017-01-30 RX ADMIN — SODIUM CHLORIDE, SODIUM LACTATE, POTASSIUM CHLORIDE, AND CALCIUM CHLORIDE: 600; 310; 30; 20 INJECTION, SOLUTION INTRAVENOUS at 14:39

## 2017-01-30 RX ADMIN — FENTANYL CITRATE 50 MCG: 50 INJECTION, SOLUTION INTRAMUSCULAR; INTRAVENOUS at 18:16

## 2017-01-30 RX ADMIN — ONDANSETRON 4 MG: 2 INJECTION INTRAMUSCULAR; INTRAVENOUS at 15:14

## 2017-01-30 RX ADMIN — FENTANYL CITRATE 25 MCG: 50 INJECTION, SOLUTION INTRAMUSCULAR; INTRAVENOUS at 16:53

## 2017-01-30 RX ADMIN — GABAPENTIN 100 MG: 100 CAPSULE ORAL at 23:49

## 2017-01-30 RX ADMIN — Medication 10 ML: at 06:00

## 2017-01-30 RX ADMIN — PROPOFOL 100 MG: 10 INJECTION, EMULSION INTRAVENOUS at 14:50

## 2017-01-30 RX ADMIN — DEXTROSE MONOHYDRATE, SODIUM CHLORIDE, AND POTASSIUM CHLORIDE 100 ML/HR: 50; 4.5; 1.49 INJECTION, SOLUTION INTRAVENOUS at 12:11

## 2017-01-30 RX ADMIN — LABETALOL HYDROCHLORIDE 10 MG: 5 INJECTION, SOLUTION INTRAVENOUS at 17:16

## 2017-01-30 RX ADMIN — LABETALOL HYDROCHLORIDE 10 MG: 5 INJECTION, SOLUTION INTRAVENOUS at 16:19

## 2017-01-30 RX ADMIN — DONEPEZIL HYDROCHLORIDE 5 MG: 5 TABLET, FILM COATED ORAL at 23:48

## 2017-01-30 RX ADMIN — DEXTROSE MONOHYDRATE, SODIUM CHLORIDE, AND POTASSIUM CHLORIDE 75 ML/HR: 50; 4.5; 1.49 INJECTION, SOLUTION INTRAVENOUS at 23:48

## 2017-01-30 RX ADMIN — LIDOCAINE HYDROCHLORIDE 40 MG: 20 INJECTION, SOLUTION EPIDURAL; INFILTRATION; INTRACAUDAL; PERINEURAL at 14:50

## 2017-01-30 RX ADMIN — FENTANYL CITRATE 25 MCG: 50 INJECTION, SOLUTION INTRAMUSCULAR; INTRAVENOUS at 16:41

## 2017-01-30 RX ADMIN — ATENOLOL 25 MG: 25 TABLET ORAL at 10:15

## 2017-01-30 RX ADMIN — Medication 3 MG: at 15:39

## 2017-01-30 RX ADMIN — OXYCODONE HYDROCHLORIDE AND ACETAMINOPHEN 1 TABLET: 5; 325 TABLET ORAL at 23:53

## 2017-01-30 RX ADMIN — ROCURONIUM BROMIDE 30 MG: 10 INJECTION, SOLUTION INTRAVENOUS at 14:50

## 2017-01-30 RX ADMIN — LABETALOL HYDROCHLORIDE 10 MG: 5 INJECTION, SOLUTION INTRAVENOUS at 16:31

## 2017-01-30 RX ADMIN — ACETAMINOPHEN 1000 MG: 10 INJECTION, SOLUTION INTRAVENOUS at 18:48

## 2017-01-30 RX ADMIN — FENTANYL CITRATE 25 MCG: 50 INJECTION, SOLUTION INTRAMUSCULAR; INTRAVENOUS at 15:41

## 2017-01-30 RX ADMIN — GLYCOPYRROLATE 0.4 MG: 0.2 INJECTION INTRAMUSCULAR; INTRAVENOUS at 15:39

## 2017-01-30 RX ADMIN — AMLODIPINE BESYLATE 2.5 MG: 2.5 TABLET ORAL at 10:15

## 2017-01-30 RX ADMIN — ACETAMINOPHEN 650 MG: 325 TABLET, FILM COATED ORAL at 23:48

## 2017-01-30 RX ADMIN — FENTANYL CITRATE 25 MCG: 50 INJECTION, SOLUTION INTRAMUSCULAR; INTRAVENOUS at 15:45

## 2017-01-30 RX ADMIN — Medication 10 ML: at 23:49

## 2017-01-30 RX ADMIN — FENTANYL CITRATE 50 MCG: 50 INJECTION, SOLUTION INTRAMUSCULAR; INTRAVENOUS at 14:46

## 2017-01-30 NOTE — ROUTINE PROCESS
Bedside and Verbal shift change report given to Laura Lange (oncoming nurse) by Amanda Bal (offgoing nurse). Report included the following information SBAR, Kardex, Intake/Output and MAR.

## 2017-01-30 NOTE — ROUTINE PROCESS
Verbal shift change report given to Lucía Arias RN (oncoming nurse) by Ariel Romero   (offgoing nurse). Report included the following information SBAR, Kardex and MAR.

## 2017-01-30 NOTE — PERIOP NOTES
Received patient from [address] [place] [address] and anesthesia provider. Patient identification, review of procedure and intraoperative course completed.

## 2017-01-30 NOTE — PERIOP NOTES
TRANSFER - IN REPORT:    Verbal report received from MALIK STEELE on Aidee Napoles  being received from [place] for ordered procedure      Report consisted of patients Situation, Background, Assessment and   Recommendations(SBAR). Information from the following report(s) SBAR, Intake/Output, MAR and Recent Results was reviewed with the receiving nurse. Opportunity for questions and clarification was provided. Assessment completed upon patients arrival to unit and care assumed.

## 2017-01-30 NOTE — PROGRESS NOTES
Readmission Risk Assessment:     Moderate Risk and MSSP/Good Help ACO patients    RRAT Score:  13-20    Initial Assessment: Reviewed chart for this 79yo female pt who is scheduled for lap cholecystectomy today. Pt was sleeping soundly, so CM spoke with pt's son/discharge caregiver, Shani Diaz, [phone], who states pt resides alone in [place] and will stay with him for a few days when she is discharged from the hospital, before flying back home. Son does not know the name of pt's PCP at home, but is checking. Son states pt can ambulate without assistance, but does have a cane and walker, which she uses at times. There are no discharge planning needs identified at this time. CM available to assist should needs arise. Emergency Contact:  Son Shani Diaz, [phone]    Pertinent Medical Hx:     Choledocholithiasis with acute choleycystitis    PCP/Specialists: PCP is in [place]; son will get the name of the doctor      Community Services:       DME:      Has cane and walker    Moderate Risk Care Transition Plan:  1. Evaluate for [place] or H2H, SNF, acute rehab, community care coordination of resources. 2. Involve patient/caregiver in assessment, planning, education and implement of intervention. 3. CM daily patient care huddles/interdisciplinary rounds. 4. PCP/Specialist appointment within 5  7 days made prior to discharge. 5. Facilitate transportation and logistics for follow-up appointments. 6. Medication reconciliation [address]  7. Formal handoff between hospital provider and post-acute provider to transition patient  Handoff to [address] Nurse Navigator or PCP practice.

## 2017-01-30 NOTE — OP NOTES
[address] [place] [address]  OPERATIVE REPORT    Name:  Carmen Sy  MR#:  [id number]  :  1927  Account #:  [de-identified]  Date of Adm:  2017  Date of Surgery:  2017      PREOPERATIVE DIAGNOSIS: Cholelithiasis and cholecystitis. POSTOPERATIVE DIAGNOSIS: Cholelithiasis and cholecystitis. PROCEDURES PERFORMED: Laparoscopic cholecystectomy. ATTENDING SURGEON: Michael Esquivel MD.    ANESTHESIA: General and local.    INDICATIONS FOR PROCEDURE: This is an 66-year-old female with  upper abdominal pain and gallstones on ultrasound. She had an ERCP  yesterday and stones were removed from her common bile duct. The  cystic duct was opened. She is now brought to the operating room for  cholecystectomy. DESCRIPTION OF PROCEDURE: The patient was brought in the  operating room, placed on the table in the supine position. After  placing monitors and adequate general anesthesia, the abdomen was  prepped and draped in the usual sterile fashion. SCD hose were  placed. A small horizontal incision was made superior to the umbilicus  through the skin down to subcutaneous tissue. A Veress needle was  placed in the abdomen and it was insufflated with carbon dioxide to 15  mmHg pressure. A 5-mm port was placed at this location, and the  scope was placed into the peritoneal cavity. Two 5 mm ports were  placed in the right subcostal area and a 12 mm port was placed in the  subxiphoid area. The liver was a little enlarged and slightly firm. The  gallbladder was distended. The Veress needle was used to aspirate  some bile. The fundus of the gallbladder was grasped and retracted  over the liver in a cephalad direction. The peritoneum overlying the  body and infundibulum was dissected free. Two branches of the cystic  artery were dissected out, clipped, and divided.  The body of the  gallbladder was removed from the liver with  the electrocautery and  dissection was carried down toward the cystic duct. The cystic duct  was completely dissected out. The common bile duct was identified  and it was enlarged and slightly inflamed. The cystic duct was clipped  and divided. The rest of the gallbladder was removed from the liver  with electrocautery. The gallbladder was brought out the subxiphoid  port. The right upper quadrant was irrigated with saline. There was no  active bleeding or evidence of a bile leak. The ports were removed  under direct visualization. There was no active bleeding. Fascial defect  in the subxiphoid area was closed with interrupted 0 Vicryl suture. Marcaine was injected locally and 4-0 Monocryl was used to  reapproximate the skin. Steri-Strips were applied and the wounds were  dressed sterilely. The sponge, instrument, and needle count was  correct at the end of the procedure. ESTIMATED BLOOD LOSS: 20 mL. SPECIMENS: Gallbladder  with stones.         MD ELIZABETH Rob / GAVI  D:  01/30/2017   15:47  T:  01/30/2017   17:00  Job #:  [id number]

## 2017-01-30 NOTE — PROGRESS NOTES
Shift Summary:  Patient slept through then night without any sx of pain or discomfort. Consent signed and on chart. CHG wipes completed. Preop checklist started. Needs completion.     Patient Vitals for the past 12 hrs:   Temp Pulse Resp BP SpO2   01/30/17 0813 98.4 °F (36.9 °C) (!) 56 16 172/56 93 %   01/30/17 0544 97.7 °F (36.5 °C) (!) 52 16 166/51 92 %   01/30/17 0034 98.1 °F (36.7 °C) (!) 57 16 147/56 96 %   01/29/17 2311 - - 16 - -

## 2017-01-30 NOTE — ANESTHESIA PREPROCEDURE EVALUATION
Anesthetic History   No history of anesthetic complications            Review of Systems / Medical History  Patient summary reviewed, nursing notes reviewed and pertinent labs reviewed    Pulmonary  Within defined limits                 Neuro/Psych   Within defined limits           Cardiovascular    Hypertension              Exercise tolerance: >4 METS     GI/Hepatic/Renal  Within defined limits              Endo/Other  Within defined limits           Other Findings              Physical Exam    Airway  Mallampati: II  TM Distance: 4 - 6 cm  Neck ROM: normal range of motion   Mouth opening: Normal     Cardiovascular    Rhythm: regular  Rate: normal         Dental         Pulmonary  Breath sounds clear to auscultation               Abdominal  GI exam deferred       Other Findings            Anesthetic Plan    ASA: 2  Anesthesia type: general          Induction: Intravenous  Anesthetic plan and risks discussed with: Patient

## 2017-01-30 NOTE — PROGRESS NOTES
Shift summary: Pt. Up with assistance to the bathroom as needed. Son in to visit. Pt. Ate most of clear liquid tray at dinner, tolerated well. Consent for vinnie jose signed and in chart. Pt. Aware of NPO at midnight. Tylenol given for headache at bedtime. Scd's in use.     Patient Vitals for the past 12 hrs:   Temp Pulse Resp BP SpO2   01/30/17 0034 98.1 °F (36.7 °C) (!) 57 16 147/56 96 %   01/29/17 2311 - - 16 - -   01/29/17 1925 99 °F (37.2 °C) 60 16 148/60 96 %   01/29/17 1722 98 °F (36.7 °C) 63 16 159/74 92 %   01/29/17 1658 - - - - 94 %   01/29/17 1530 98 °F (36.7 °C) (!) 59 16 186/70 97 %   01/29/17 1430 97.8 °F (36.6 °C) (!) 55 16 185/64 94 %   01/29/17 1404 97.4 °F (36.3 °C) (!) 56 16 185/68 94 %   01/29/17 1333 97.8 °F (36.6 °C) (!) 54 18 173/56 94 %

## 2017-01-30 NOTE — PROGRESS NOTES
TRANSFER - OUT REPORT:    Verbal report given to MALIK Norman(name) on Dujarod Gum  being transferred to Pre-op(unit) for ordered procedure       Report consisted of patients Situation, Background, Assessment and   Recommendations(SBAR). Information from the following report(s) SBAR, Kardex, Intake/Output, MAR, Recent Results and Med Rec Status was reviewed with the receiving nurse. Lines:   Peripheral IV 01/29/17 Left Forearm (Active)   Site Assessment Clean, dry, & intact 1/30/2017 10:19 AM   Phlebitis Assessment 0 1/30/2017 10:19 AM   Infiltration Assessment 0 1/30/2017 10:19 AM   Dressing Status Clean, dry, & intact 1/30/2017 10:19 AM   Dressing Type Tape;Transparent 1/30/2017 10:19 AM   Hub Color/Line Status Infusing 1/30/2017 10:19 AM   Action Taken Open ports on tubing capped 1/30/2017 10:19 AM   Alcohol Cap Used Yes 1/30/2017 10:19 AM        Opportunity for questions and clarification was provided.       Patient transported with:   [facility]

## 2017-01-30 NOTE — INTERVAL H&P NOTE
H&P Update:  Ronnie Mota was seen and examined. History and physical has been reviewed. The patient has been examined. There have been no significant clinical changes since the completion of the originally dated History and Physical.  Patient identified by surgeon; surgical site was confirmed by patient and surgeon.     Signed By: Alcon Coombs MD     January 30, 2017 2:28 PM

## 2017-01-31 VITALS
SYSTOLIC BLOOD PRESSURE: 139 MMHG | DIASTOLIC BLOOD PRESSURE: 56 MMHG | TEMPERATURE: 98.6 F | BODY MASS INDEX: 26.31 KG/M2 | HEIGHT: 62 IN | HEART RATE: 56 BPM | OXYGEN SATURATION: 93 % | WEIGHT: 143 LBS | RESPIRATION RATE: 18 BRPM

## 2017-01-31 PROCEDURE — 74011250637 HC RX REV CODE- 250/637: Performed by: SURGERY

## 2017-01-31 PROCEDURE — 77010033678 HC OXYGEN DAILY

## 2017-01-31 RX ORDER — OXYCODONE AND ACETAMINOPHEN 5; 325 MG/1; MG/1
1 TABLET ORAL
Qty: 20 TAB | Refills: 0 | Status: SHIPPED | OUTPATIENT
Start: 2017-01-31 | End: 2021-11-26

## 2017-01-31 RX ADMIN — ATENOLOL 25 MG: 25 TABLET ORAL at 08:48

## 2017-01-31 RX ADMIN — AMLODIPINE BESYLATE 2.5 MG: 2.5 TABLET ORAL at 08:49

## 2017-01-31 RX ADMIN — CITALOPRAM HYDROBROMIDE 20 MG: 20 TABLET ORAL at 08:49

## 2017-01-31 RX ADMIN — GABAPENTIN 100 MG: 100 CAPSULE ORAL at 08:49

## 2017-01-31 RX ADMIN — PANTOPRAZOLE SODIUM 40 MG: 40 TABLET, DELAYED RELEASE ORAL at 08:48

## 2017-01-31 NOTE — ROUTINE PROCESS
TRANSFER - IN REPORT:    Verbal report received from KARRIE RN(name) on [facility]  being received from Rogelio Amato RN(unit) for routine post - op      Report consisted of patients Situation, Background, Assessment and   Recommendations(SBAR). Information from the following report(s) SBAR, Kardex, Intake/Output and MAR was reviewed with the receiving nurse. Opportunity for questions and clarification was provided. Assessment completed upon patients arrival to unit and care assumed.

## 2017-01-31 NOTE — PERIOP NOTES
Patient transfer to room 316, on remote telemetry and continuous pulse oximetry. Family updated by [facility]. Handoff with Shauna Banda RN.     Visit Vitals    /56 (BP 1 Location: Right arm, BP Patient Position: At rest)    Pulse 76    Temp 98.8 °F (37.1 °C)    Resp 14    Ht 5' 2\" (1.575 m)    Wt 64.9 kg (143 lb)    SpO2 94%    BMI 26.15 kg/m2

## 2017-01-31 NOTE — PERIOP NOTES
Dr. Brittanie Hooper at bedside for post op assessment, patient reports cough for \"several years, I see many doctors for it but no one knows what causes it. \"  Patient VSS, meets pre op status. Orders patient appropriate for discharge to [address] [place], [place] [address]Medical Unit post op with remote telemetry and continuous pulse oximetry monitoring. Dr. Deepika Sweeney notified, no additional orders.

## 2017-01-31 NOTE — DISCHARGE INSTRUCTIONS
DISCHARGE SUMMARY from Nurse    The following personal items are in your possession at time of discharge:    Dental Appliances: None        Home Medications: None  Jewelry: None  Clothing: None  Other Valuables: None             PATIENT INSTRUCTIONS:    After general anesthesia or intravenous sedation, for 24 hours or while taking prescription Narcotics:  · Limit your activities  · Do not drive and operate hazardous machinery  · Do not make important personal or business decisions  · Do  not drink alcoholic beverages  · If you have not urinated within 8 hours after discharge, please contact your surgeon on call. Report the following to your surgeon:  · Excessive pain, swelling, redness or odor of or around the surgical area  · Temperature over 100.5  · Nausea and vomiting lasting longer than 4 hours or if unable to take medications  · Any signs of decreased circulation or nerve impairment to extremity: change in color, persistent  numbness, tingling, coldness or increase pain  · Any questions        What to do at Home:  Recommended activity: Activity as tolerated,     If you experience any of the following symptoms nausea and vomiting, uncontrollable pain, fever over 100.5, odor discharge at incision site, please follow up with Dr. Afia Jimenez. *  Please give a list of your current medications to your Primary Care Provider. *  Please update this list whenever your medications are discontinued, doses are      changed, or new medications (including over-the-counter products) are added. *  Please carry medication information at all times in case of emergency situations. These are general instructions for a healthy lifestyle:    No smoking/ No tobacco products/ Avoid exposure to second hand smoke    Surgeon General's Warning:  Quitting smoking now greatly reduces serious risk to your health.     Obesity, smoking, and sedentary lifestyle greatly increases your risk for illness    A healthy diet, regular physical exercise & weight monitoring are important for maintaining a healthy lifestyle    You may be retaining fluid if you have a history of heart failure or if you experience any of the following symptoms:  Weight gain of 3 pounds or more overnight or 5 pounds in a week, increased swelling in our hands or feet or shortness of breath while lying flat in bed. Please call your doctor as soon as you notice any of these symptoms; do not wait until your next office visit. Recognize signs and symptoms of STROKE:    F-face looks uneven    A-arms unable to move or move unevenly    S-speech slurred or non-existent    T-time-call 911 as soon as signs and symptoms begin-DO NOT go       Back to bed or wait to see if you get better-TIME IS BRAIN. Warning Signs of HEART ATTACK     Call 911 if you have these symptoms:   Chest discomfort. Most heart attacks involve discomfort in the center of the chest that lasts more than a few minutes, or that goes away and comes back. It can feel like uncomfortable pressure, squeezing, fullness, or pain.  Discomfort in other areas of the upper body. Symptoms can include pain or discomfort in one or both arms, the back, neck, jaw, or stomach.  Shortness of breath with or without chest discomfort.  Other signs may include breaking out in a cold sweat, nausea, or lightheadedness. Don't wait more than five minutes to call 911 - MINUTES MATTER! Fast action can save your life. Calling 911 is almost always the fastest way to get lifesaving treatment. Emergency Medical Services staff can begin treatment when they arrive -- up to an hour sooner than if someone gets to the hospital by car. The discharge information has been reviewed with the patient and guardian. The patient and guardian verbalized understanding. Discharge medications reviewed with the patient and guardian and appropriate educational materials and side effects teaching were provided. Cholecystectomy: What to Expect at [address] [place] [address]  After your surgery, it is normal to feel weak and tired for several days after you return home. Your belly may be swollen. If you had laparoscopic surgery, you may also have pain in your shoulder for about 24 hours. You may have gas or need to burp a lot at first, and a few people get diarrhea. The diarrhea usually goes away in 2 to 4 weeks, but it may last longer. How quickly you recover depends on whether you had a laparoscopic or open surgery. · For a laparoscopic surgery, most people can go back to work or their normal routine in 1 to 2 weeks, but it may take longer, depending on the type of work you do. · For an open surgery, it will probably take 4 to 6 weeks before you get back to your normal routine. This care sheet gives you a general idea about how long it will take for you to recover. However, each person recovers at a different pace. Follow the steps below to get better as quickly as possible. How can you care for yourself at home? Activity  · Rest when you feel tired. Getting enough sleep will help you recover. · Try to walk each day. Start out by walking a little more than you did the day before. Gradually increase the amount you walk. Walking boosts blood flow and helps prevent pneumonia and constipation. · For about 2 to 4 weeks, avoid lifting anything that would make you strain. This may include a child, heavy grocery bags and milk containers, a heavy briefcase or backpack, cat litter or dog food bags, or a vacuum . · Avoid strenuous activities, such as biking, jogging, weightlifting, and aerobic exercise, until your doctor says it is okay. · You may shower 24 to 48 hours after surgery, if your doctor okays it. Pat the cut (incision) dry. Do not take a bath for the first 2 weeks, or until your doctor tells you it is okay.   · You may drive when you are no longer taking pain medicine and can quickly move your foot from the gas pedal to the brake. You must also be able to sit comfortably for a long period of time, even if you do not plan to go far. You might get caught in traffic. · For a laparoscopic surgery, most people can go back to work or their normal routine in 1 to 2 weeks, but it may take longer. For an open surgery, it will probably take 4 to 6 weeks before you get back to your normal routine. · Your doctor will tell you when you can have sex again. Diet  · Eat smaller meals more often instead of fewer larger meals. You can eat a normal diet, but avoid eating fatty foods for about 1 month. Fatty foods include hamburger, whole milk, cheese, and many snack foods. If your stomach is upset, try bland, low-fat foods like plain rice, broiled chicken, toast, and yogurt. · Drink plenty of fluids (unless your doctor tells you not to). · If you have diarrhea, try avoiding spicy foods, dairy products, fatty foods, and alcohol. You can also watch to see if specific foods cause it, and stop eating them. If the diarrhea continues for more than 2 weeks, talk to your doctor. · You may notice that your bowel movements are not regular right after your surgery. This is common. Try to avoid constipation and straining with bowel movements. You may want to take a fiber supplement every day. If you have not had a bowel movement after a couple of days, ask your doctor about taking a mild laxative. Medicines  · Your doctor will tell you if and when you can restart your medicines. He or she will also give you instructions about taking any new medicines. · If you take blood thinners, such as warfarin (Coumadin), clopidogrel (Plavix), or aspirin, be sure to talk to your doctor. He or she will tell you if and when to start taking those medicines again. Make sure that you understand exactly what your doctor wants you to do. · Take pain medicines exactly as directed.   ¨ If the doctor gave you a prescription medicine for pain, take it as prescribed. ¨ If you are not taking a prescription pain medicine, take an over-the-counter medicine such as acetaminophen (Tylenol), ibuprofen (Advil, Motrin), or naproxen (Aleve). Read and follow all instructions on the label. ¨ Do not take two or more pain medicines at the same time unless the doctor told you to. Many pain medicines contain acetaminophen, which is Tylenol. Too much Tylenol can be harmful. · If you think your pain medicine is making you sick to your stomach:  ¨ Take your medicine after meals (unless your doctor tells you not to). ¨ Ask your doctor for a different pain medicine. · If your doctor prescribed antibiotics, take them as directed. Do not stop taking them just because you feel better. You need to take the full course of antibiotics. Incision care  · If you have strips of tape on the incision, or cut, leave the tape on for a week or until it falls off. · After 24 to 48 hours, wash the area daily with warm, soapy water, and pat it dry. · You may have staples to hold the cut together. Keep them dry until your doctor takes them out. This is usually in 7 to 10 days. · Keep the area clean and dry. You may cover it with a gauze bandage if it weeps or rubs against clothing. Change the bandage every day. Ice  · To reduce swelling and pain, put ice or a cold pack on your belly for 10 to 20 minutes at a time. Do this every 1 to 2 hours. Put a thin cloth between the ice and your skin. Follow-up care is a key part of your treatment and safety. Be sure to make and go to all appointments, and call your doctor if you are having problems. It's also a good idea to know your test results and keep a list of the medicines you take. When should you call for help? Call 911 anytime you think you may need emergency care. For example, call if:  · You passed out (lost consciousness). · You have severe trouble breathing. · You have sudden chest pain and shortness of breath, or you cough up blood.   Call your doctor now or seek immediate medical care if:  · You are sick to your stomach and cannot drink fluids. · You have pain that does not get better when you take your pain medicine. · You have signs of infection, such as:  ¨ Increased pain, swelling, warmth, or redness. ¨ Red streaks leading from the incision. ¨ Pus draining from the incision. ¨ Swollen lymph nodes in your neck, armpits, or groin. ¨ A fever. · Your urine turns dark brown or your stool is light-colored or janna-colored. · Your skin or the whites of your eyes turn yellow. · Bright red blood has soaked through a large bandage over your incision. · You have signs of a blood clot, such as:  ¨ Pain in your calf, back of knee, thigh, or groin. ¨ Redness and swelling in your leg or groin. · You have trouble passing urine or stool, especially if you have mild pain or swelling in your lower belly. Watch closely for any changes in your health, and be sure to contact your doctor if:  · You had a laparoscopic surgery and your shoulder pain lasts more than 24 hours. · You do not have a bowel movement after taking a laxative. Where can you learn more? Go to http://kendall-asher.info/. Enter [phone] in the search box to learn more about \"Cholecystectomy: What to Expect at Home. \"  Current as of: August 9, 2016  Content Version: 11.1  © 7987-0582 [employer]. Care instructions adapted under license by [employer] (which disclaims liability or warranty for this information). If you have questions about a medical condition or this instruction, always ask your healthcare professional. [place] [address] any warranty or liability for your use of this information.

## 2017-01-31 NOTE — ANESTHESIA POSTPROCEDURE EVALUATION
Post-Anesthesia Evaluation and Assessment    Cardiovascular Function/Vital Signs  Visit Vitals    /68    Pulse 81    Temp 36.8 °C (98.3 °F)    Resp 23    Ht 5' 2\" (1.575 m)    Wt 64.9 kg (143 lb)    SpO2 95%    BMI 26.15 kg/m2       Patient is status post Procedure(s):  CHOLECYSTECTOMY LAPAROSCOPIC. Nausea/Vomiting: Controlled. Postoperative hydration reviewed and adequate. Pain:  Pain Scale 1: FLACC (01/30/17 1830)  Pain Intensity 1: 0 (01/30/17 1830)   Managed. Neurological Status:   Neuro (WDL): Within Defined Limits (01/29/17 1254)   At baseline. Mental Status and Level of Consciousness: Arousable. Pulmonary Status:   O2 Device: Nasal cannula (01/30/17 3965)   Adequate oxygenation and airway patent. Complications related to anesthesia: None    Post-anesthesia assessment completed. No concerns. Patient has met all discharge requirements.     Signed By: Cy Espana CRNA    January 30, 2017

## 2017-01-31 NOTE — ROUTINE PROCESS
Bedside shift change report given to LEIGH ANN Freitas (oncoming nurse) by Dana Layne RN (offgoing nurse). Report included the following information SBAR, Kardex, Intake/Output and MAR.

## 2017-01-31 NOTE — PROGRESS NOTES
46  Son said patient not ready to go home he would like her to stay. Called Dr Alva Ortiz. Dr. Alva Ortiz said there is nothing she can do on her side pt. Is ready for discharge. 1140  Informed the pt. And son. Not happy with the arrangement. Called Dr. Alva Ortiz again to speak with the son. Dr. Alva Ortiz spoke with son. Son and pt. Ok with discharge plan if we watch pt. At 1600  Shift Summary  Pt. Had uneventful shift. Denies pain or nausea .

## 2017-01-31 NOTE — PROGRESS NOTES
2050: Came from pacu via bed, escorted by nurse's. Pt alert and oriented but drowsy and sitting on bed pan at this time trying to void. Vitals obtained and charted, see flow sheet. Abdominal lap site x4, dry and intact, o22l in use. Call light in reach. 2348: Night time medication given at this time along with percocet for pain and tylenol for headache. Tele box 42 in use. IVF running via left arm #22.     0330: Resting in bed with eyes closed, no distress noted. Respirations even and unlabored. 0531: Voided clear yellow urine assisted by nurse's aid.    0640: Ambulated to bathroom to void, CHG wipes done. Ambulated in hallway and back in bed with no distress noted. Call light and ICS at bedside, encouraged to use. Shift Summary: Assessment unchanged.

## 2017-01-31 NOTE — PROGRESS NOTES
Reviewed chart and discussed case with Dr. Elvie Navarro this morning and requested note for pt to confirm she is in hospital; per pt's son , this is needed for the airlines because pt has to reschedule her flight home to [place]. Reviewed with pt that she is scheduled for medical follow-up appt. with Dr. Elvie Navarro on 2/7/17 (see AVS). There are no other discharge planning needs noted at this time.     Care Management Interventions  Transition of Care Consult (CM Consult): Discharge Planning  MyChart Signup: No  Discharge Durable Medical Equipment: No  Health Maintenance Reviewed: Yes  Physical Therapy Consult: No  Occupational Therapy Consult: No  Speech Therapy Consult: No  Current Support Network: Lives Alone  Confirm Follow Up Transport: Family  Plan discussed with Pt/Family/Caregiver: Yes  Discharge Location  Discharge Placement: Home with family assistance

## 2017-01-31 NOTE — ROUTINE PROCESS
Bedside and Verbal shift change report given to Janice Grover RN (oncoming nurse) by Ilan Leon   (offgoing nurse). Report included the following information SBAR, Kardex, Procedure Summary, Intake/Output, MAR, Recent Results and Med Rec Status.

## 2017-01-31 NOTE — PERIOP NOTES
TRANSFER - OUT REPORT:    Verbal report given to DANO Vail RN(name) on Jono Ordonez  being transferred to [address] [place] [address](unit) for routine post - op       Report consisted of patients Situation, Background, Assessment and   Recommendations(SBAR). Information from the following report(s) SBAR, OR Summary, Intake/Output and MAR was reviewed with the receiving nurse. Lines:   Peripheral IV 01/29/17 Left Forearm (Active)   Site Assessment Clean, dry, & intact 1/30/2017  5:00 PM   Phlebitis Assessment 0 1/30/2017  5:00 PM   Infiltration Assessment 0 1/30/2017  5:00 PM   Dressing Status Clean, dry, & intact 1/30/2017  5:00 PM   Dressing Type Transparent 1/30/2017  5:00 PM   Hub Color/Line Status Blue; Infusing 1/30/2017  5:00 PM   Action Taken Open ports on tubing capped 1/30/2017 10:19 AM   Alcohol Cap Used Yes 1/30/2017 10:19 AM        Opportunity for questions and clarification was provided.       Patient transported with:   Monitor  O2 @ 2 liters  Registered Nurse  [facility]

## 2017-02-14 NOTE — DISCHARGE SUMMARY
Avinash Christine [facility] SUMMARY    Name:  Irais Pina  MR#:  [id number]  :  [id number]  Account #:  [de-identified]  Date of Adm:  2017  Date of Discharge:  2017      FINAL DIAGNOSES:  1. Cholelithiasis. 2. Cholecystitis. 3. Choledocholithiasis. PROCEDURE: ERCP and laparoscopic cholecystectomy. HISTORY OF PRESENT ILLNESS: An 41-year-old female who came  to the emergency room with abdominal pain and was found to have  gallstones on imaging as well as a dilated common bile duct and she  was jaundiced. She was admitted to the hospital for further evaluation  and treatment. HOSPITAL COURSE: The patient was started on IV fluids. GI consult  was obtained for preoperative ERCP. This was done and the patient  tolerated it well. She was then taken to the operating room for  laparoscopic cholecystectomy which was done on 2017. The  patient did well postoperatively. She had minimal pain. She was  started on liquids and advanced to a solid diet without difficulty. She  was ready for discharge and she is to followup in the office in a week.         MD ELIZABETH Santos / Angel.Rosie  D:  2017   13:06  T:  2017   06:57  Job #:  [id number]

## (undated) DEVICE — KENDALL RADIOLUCENT FOAM MONITORING ELECTRODE RECTANGULAR SHAPE: Brand: KENDALL

## (undated) DEVICE — BITEBLOCK ENDOSCP 60FR MAXI WHT POLYETH STURDY W/ VELC WVN

## (undated) DEVICE — INSULATED BLADE ELECTRODE: Brand: EDGE

## (undated) DEVICE — MASTISOL ADHESIVE LIQ 2/3ML

## (undated) DEVICE — SINGLE USE RETRIEVAL BASKET V: Brand: SINGLE USE RETRIEVAL BASKET V

## (undated) DEVICE — MEDI-VAC NON-CONDUCTIVE SUCTION TUBING: Brand: CARDINAL HEALTH

## (undated) DEVICE — SOLIDIFIER FLUID 3000 CC ABSORB

## (undated) DEVICE — TUBING FLTR PLUME AWAY EVAC W/ SUCT DEV DISP PUREVIEW

## (undated) DEVICE — (D)SYR 10ML 1/5ML GRAD NSAF -- PKGING CHANGE USE ITEM 338027

## (undated) DEVICE — MAJ-1414 SINGLE USE ADPATER BIOPSY VALV: Brand: SINGLE USE ADAPTOR BIOPSY VALVE

## (undated) DEVICE — NDL FLTR TIP 5 MIC 18GX1.5IN --

## (undated) DEVICE — SINGLE USE 3-LUMEN SPHINCTEROTOME V: Brand: SINGLE USE 3-LUMEN SPHINCTEROTOME V

## (undated) DEVICE — E-Z CLEAN, PTFE COATED, ELECTROSURGICAL LAPAROSCOPIC ELECTRODE, J-HOOK, 33 CM., SINGLE-USE, FOR USE WITH HAND CONTROL PENCIL: Brand: MEGADYNE

## (undated) DEVICE — STERILE POLYISOPRENE POWDER-FREE SURGICAL GLOVES WITH EMOLLIENT COATING: Brand: PROTEXIS

## (undated) DEVICE — 4-PORT MANIFOLD: Brand: NEPTUNE 2

## (undated) DEVICE — DEVON™ KNEE AND BODY STRAP 60" X 3" (1.5 M X 7.6 CM): Brand: DEVON

## (undated) DEVICE — (D)PREP SKN CHLRAPRP APPL 26ML -- CONVERT TO ITEM 371833

## (undated) DEVICE — NEEDLE HYPO 25GA L1.5IN BLU POLYPR HUB S STL REG BVL STR

## (undated) DEVICE — SYR 5ML 1/5 GRAD LL NSAF LF --

## (undated) DEVICE — TROCAR: Brand: KII SHIELDED BLADED ACCESS SYSTEM

## (undated) DEVICE — DISPOSABLE SUCTION/IRRIGATOR TUBE SET WITH TIP: Brand: AHTO

## (undated) DEVICE — STERILE COTTON TIPPED APPLICATORS: Brand: PURITAN

## (undated) DEVICE — 1200 GUARD II KIT W/5MM TUBE W/O VAC TUBE: Brand: GUARDIAN

## (undated) DEVICE — KENDALL SCD EXPRESS SLEEVES, KNEE LENGTH, MEDIUM: Brand: KENDALL SCD

## (undated) DEVICE — SET ADMIN 16ML TBNG L100IN 2 Y INJ SITE IV PIGGY BK DISP

## (undated) DEVICE — ENDO CARRY-ON PROCEDURE KIT INCLUDES ENZYMATIC SPONGE, GAUZE, BIOHAZARD LABEL, TRAY, LUBRICANT, DIRTY SCOPE LABEL, WATER LABEL, TRAY, DRAWSTRING PAD, AND DEFENDO 4-PIECE KIT.: Brand: ENDO CARRY-ON PROCEDURE KIT

## (undated) DEVICE — AGENT HEMSTAT W6XL9IN OXIDIZED REGENERATED CELOS ABSRB FOR

## (undated) DEVICE — SPONGE GZ W4XL4IN RAYON POLY 4 PLY NONWOVEN FASTER WICKING

## (undated) DEVICE — (D)STRIP SKN CLSR 0.5X4IN WHT --

## (undated) DEVICE — SOLUTION IV 500ML 0.9% SOD CHL FLX CONT

## (undated) DEVICE — NEEDLE INSUF L120MM DIA2MM DISP FOR PNEUMOPERI ENDOPATH

## (undated) DEVICE — SOL IRRIGATION INJ NACL 0.9% 500ML BTL

## (undated) DEVICE — PREP SKN CHLRAPRP SNGL 1.75ML --

## (undated) DEVICE — SYR LR LCK 1ML GRAD NSAF 30ML --

## (undated) DEVICE — COVADERM PLUS: Brand: DEROYAL

## (undated) DEVICE — TRNQT TEXT 1X18IN BLU LF DISP -- CONVERT TO ITEM 362165

## (undated) DEVICE — APPLIER CLP L SHFT DIA12MM 20 ROT MULT LIGACLP

## (undated) DEVICE — SYRINGE 50ML E/T

## (undated) DEVICE — TROCAR ENDOSCP L100MM DIA12MM STBL SL BLDELSS ENDOPATH XCEL

## (undated) DEVICE — LIGHT HANDLE: Brand: DEVON

## (undated) DEVICE — WRISTBAND ID AD W2.5XL9.5CM RED VYN ADH CLSR UNI-PRINT

## (undated) DEVICE — NDL PRT INJ NSAF BLNT 18GX1.5 --

## (undated) DEVICE — INTENDED FOR TISSUE SEPARATION, AND OTHER PROCEDURES THAT REQUIRE A SHARP SURGICAL BLADE TO PUNCTURE OR CUT.: Brand: BARD-PARKER ® CARBON RIB-BACK BLADES

## (undated) DEVICE — SET EXTN TBNG L BOR 4 W STPCOCK ST 32IN PRIMING VOL 6ML

## (undated) DEVICE — SUT MONOCRYL PLUS UD 4-0 --

## (undated) DEVICE — CATH IV SAFE STR 22GX1IN BLU -- PROTECTIV PLUS

## (undated) DEVICE — SOLUTION LACTATED RINGERS INJECTION USP

## (undated) DEVICE — Device

## (undated) DEVICE — ENDOCUT SCISSOR TIP, DISPOSABLE: Brand: RENEW

## (undated) DEVICE — GUIDEWIRE ENDOSCP DIA0.025IN L270CM HYDRPHLC STR TIP RG

## (undated) DEVICE — CATH SUC CTRL PRT TRIFLO 14FR --

## (undated) DEVICE — SUT VCRL + 0 36IN UR6 VIO --

## (undated) DEVICE — SYR 3ML LL TIP 1/10ML GRAD --

## (undated) DEVICE — CANNULA CUSH AD W/ 14FT TBG

## (undated) DEVICE — REM POLYHESIVE ADULT PATIENT RETURN ELECTRODE: Brand: VALLEYLAB

## (undated) DEVICE — TROCAR: Brand: KII® SLEEVE